# Patient Record
Sex: MALE | Race: WHITE | NOT HISPANIC OR LATINO | URBAN - METROPOLITAN AREA
[De-identification: names, ages, dates, MRNs, and addresses within clinical notes are randomized per-mention and may not be internally consistent; named-entity substitution may affect disease eponyms.]

---

## 2018-08-23 ENCOUNTER — INPATIENT (INPATIENT)
Facility: HOSPITAL | Age: 83
LOS: 1 days | Discharge: HOME | End: 2018-08-25
Attending: SURGERY | Admitting: SURGERY
Payer: COMMERCIAL

## 2018-08-23 VITALS
OXYGEN SATURATION: 98 % | TEMPERATURE: 98 F | SYSTOLIC BLOOD PRESSURE: 181 MMHG | HEART RATE: 60 BPM | DIASTOLIC BLOOD PRESSURE: 98 MMHG | RESPIRATION RATE: 18 BRPM

## 2018-08-23 LAB
ALBUMIN SERPL ELPH-MCNC: 3.8 G/DL — SIGNIFICANT CHANGE UP (ref 3.5–5.2)
ALP SERPL-CCNC: 46 U/L — SIGNIFICANT CHANGE UP (ref 30–115)
ALT FLD-CCNC: 11 U/L — SIGNIFICANT CHANGE UP (ref 0–41)
ANION GAP SERPL CALC-SCNC: 12 MMOL/L — SIGNIFICANT CHANGE UP (ref 7–14)
APTT BLD: 31.3 SEC — SIGNIFICANT CHANGE UP (ref 27–39.2)
AST SERPL-CCNC: 25 U/L — SIGNIFICANT CHANGE UP (ref 0–41)
BASOPHILS # BLD AUTO: 0.03 K/UL — SIGNIFICANT CHANGE UP (ref 0–0.2)
BASOPHILS NFR BLD AUTO: 0.3 % — SIGNIFICANT CHANGE UP (ref 0–1)
BILIRUB SERPL-MCNC: 0.3 MG/DL — SIGNIFICANT CHANGE UP (ref 0.2–1.2)
BUN SERPL-MCNC: 28 MG/DL — HIGH (ref 10–20)
CALCIUM SERPL-MCNC: 9.2 MG/DL — SIGNIFICANT CHANGE UP (ref 8.5–10.1)
CHLORIDE SERPL-SCNC: 105 MMOL/L — SIGNIFICANT CHANGE UP (ref 98–110)
CO2 SERPL-SCNC: 25 MMOL/L — SIGNIFICANT CHANGE UP (ref 17–32)
CREAT SERPL-MCNC: 1.2 MG/DL — SIGNIFICANT CHANGE UP (ref 0.7–1.5)
EOSINOPHIL # BLD AUTO: 0.6 K/UL — SIGNIFICANT CHANGE UP (ref 0–0.7)
EOSINOPHIL NFR BLD AUTO: 6.8 % — SIGNIFICANT CHANGE UP (ref 0–8)
ETHANOL SERPL-MCNC: <10 MG/DL — HIGH
GLUCOSE SERPL-MCNC: 130 MG/DL — HIGH (ref 70–99)
HCT VFR BLD CALC: 27.4 % — LOW (ref 42–52)
HGB BLD-MCNC: 9.1 G/DL — LOW (ref 14–18)
IMM GRANULOCYTES NFR BLD AUTO: 1.5 % — HIGH (ref 0.1–0.3)
INR BLD: 1.12 RATIO — SIGNIFICANT CHANGE UP (ref 0.65–1.3)
LACTATE SERPL-SCNC: 1 MMOL/L — SIGNIFICANT CHANGE UP (ref 0.5–2.2)
LIDOCAIN IGE QN: 39 U/L — SIGNIFICANT CHANGE UP (ref 7–60)
LYMPHOCYTES # BLD AUTO: 2.08 K/UL — SIGNIFICANT CHANGE UP (ref 1.2–3.4)
LYMPHOCYTES # BLD AUTO: 23.7 % — SIGNIFICANT CHANGE UP (ref 20.5–51.1)
MCHC RBC-ENTMCNC: 31 PG — SIGNIFICANT CHANGE UP (ref 27–31)
MCHC RBC-ENTMCNC: 33.2 G/DL — SIGNIFICANT CHANGE UP (ref 32–37)
MCV RBC AUTO: 93.2 FL — SIGNIFICANT CHANGE UP (ref 80–94)
MONOCYTES # BLD AUTO: 0.67 K/UL — HIGH (ref 0.1–0.6)
MONOCYTES NFR BLD AUTO: 7.6 % — SIGNIFICANT CHANGE UP (ref 1.7–9.3)
NEUTROPHILS # BLD AUTO: 5.28 K/UL — SIGNIFICANT CHANGE UP (ref 1.4–6.5)
NEUTROPHILS NFR BLD AUTO: 60.1 % — SIGNIFICANT CHANGE UP (ref 42.2–75.2)
PLATELET # BLD AUTO: 176 K/UL — SIGNIFICANT CHANGE UP (ref 130–400)
POTASSIUM SERPL-MCNC: 4.4 MMOL/L — SIGNIFICANT CHANGE UP (ref 3.5–5)
POTASSIUM SERPL-SCNC: 4.4 MMOL/L — SIGNIFICANT CHANGE UP (ref 3.5–5)
PROT SERPL-MCNC: 6.1 G/DL — SIGNIFICANT CHANGE UP (ref 6–8)
PROTHROM AB SERPL-ACNC: 12.1 SEC — SIGNIFICANT CHANGE UP (ref 9.95–12.87)
RBC # BLD: 2.94 M/UL — LOW (ref 4.7–6.1)
RBC # FLD: 17.8 % — HIGH (ref 11.5–14.5)
SODIUM SERPL-SCNC: 142 MMOL/L — SIGNIFICANT CHANGE UP (ref 135–146)
TYPE + AB SCN PNL BLD: SIGNIFICANT CHANGE UP
WBC # BLD: 8.79 K/UL — SIGNIFICANT CHANGE UP (ref 4.8–10.8)
WBC # FLD AUTO: 8.79 K/UL — SIGNIFICANT CHANGE UP (ref 4.8–10.8)

## 2018-08-23 RX ORDER — TETANUS AND DIPHTHERIA TOXOIDS ADSORBED 2; 2 [LF]/.5ML; [LF]/.5ML
0.5 INJECTION INTRAMUSCULAR ONCE
Qty: 0 | Refills: 0 | Status: COMPLETED | OUTPATIENT
Start: 2018-08-23 | End: 2018-08-23

## 2018-08-23 NOTE — ED PROVIDER NOTE - PHYSICAL EXAMINATION
Non toxic appearing, NAD. Head normocephalic, laceration to occiput, C collar in place, no battles sign or raccoon eyes. Skin  warm and dry, no acute rash, skin tear to R hand. PERRLA/EOM, conjunctiva and sclera clear. MM moist, no nasal discharge.  Dentition intact. Pharynx unremarkable. TM's unremarkable, no bulging, normal light reflex, no hemotympanum.  Neck supple, nt, no midline ttp or stepoffs. Back nttp no midline ttp or stepoffs. Heart RRR s1s2 nl, no rub/murmur. Lungs- BS equal, CTAB. Abdomen soft ntnd no r/g, no bruising. Chest no bruising, sub cutaneous emphysema, or crepitus, nttp. Extremities- moves all normally, sensation wnl, no cyanosis. +5/5 strength and sensation wnl in all extremities. No saddle anesthesia, nl rectal tone.

## 2018-08-23 NOTE — ED PROVIDER NOTE - MEDICAL DECISION MAKING DETAILS
85yr old male on asa presented to ed s/p fall. the pt reports that was aboutt o start walking down steps, tripped and fell. pt remembers waking up on floor. no loc, n/v. on exam with hematoma to back, laceation to posterior occiuput, awake alert, gcs 15, s1s2 ctab soft nt/nd. no pain with compression of ribs. pt with significant hematoma, repeat cbc drawn, admitted to surgical team. they will follow repeat cbc.

## 2018-08-23 NOTE — ED ADULT NURSE NOTE - OBJECTIVE STATEMENT
s/p fall down 15-20 steps, +LOC, pt. denies pain at this time, lac noted to back of head and R hand and L shoulder, pt is a&o x4 at this time, on cardiac monitor, brought in as trauma alert, bleeding is controlled

## 2018-08-23 NOTE — ED PROVIDER NOTE - PROGRESS NOTE DETAILS
Pt endorsed to Dr. Ybarra Spoke w/ viviane Enamorado to admit to josh Kurtz f/u CBC pt s/o to me, f/u imaging

## 2018-08-23 NOTE — ED PROVIDER NOTE - ATTENDING CONTRIBUTION TO CARE
Pt presents as trauma activation for fall 10-20 steps with LOC.  Pt is 85yoM hx stroke on aspirin.  Pt seen with ED team on arrival, with trauma joining.  Pt thinks he tripped or missed a step but cannot recall the sequence of events.    Primary survey  airway intact  b/l breath sounds  b/l radial, fem, DP pulses intact  PERRL, GCS 15, sensation intact, moving ext x 4 spontaneously  exposure notable for lac to posterior occiput w/o sig bleeding    VS HR 60-70s SBP 170s    Secondary survey  scalp lac  no anterior facial trauma noted  no nasal/intraoral bleeding noted  c-collar in place  no chest wall tenderness or crepitus  abd soft, NT, ND  pelvis stable  moving ext x 4  skin tear to R hand/wrist  no t/l/s spinal tenderness    PMH/PSH stroke  Meds aspirin (unclear compliance)  NKDA  lives at home w/ wife, works daily as     18g PIV x 2  CTH, c-spine, C/A/P  xray R hand/wrist  tetanus as needed    dispo per trauma team, but concern for possible syncopal event causing fall    pt endorsed to Dr. Ybarra at [phone]

## 2018-08-23 NOTE — ED PROVIDER NOTE - OBJECTIVE STATEMENT
86 y/o F pmh anemia, ICH, prostate ca p/f fall down 20 steps at home, +LOC, pt states he "was at the top of the stairs and I woke up at the bottom", does not recall anything else. Pt has no complaints currently. Denies HA< neck pain, back pain, CP, SOB, n/v, abd pain, extremity pain/weakness/numbness/tingling.

## 2018-08-23 NOTE — ED PROVIDER NOTE - NS ED ROS FT
Constitutional: See HPI.  Eyes: No visual changes, eye pain or discharge.  ENMT: No hearing changes, pain, discharge or infections. No neck pain or stiffness.  Cardiac: No chest pain, SOB or edema. No chest pain with exertion.  Respiratory: No cough or respiratory distress.   GI: No nausea, vomiting, diarrhea or abdominal pain.  : No dysuria, frequency or burning.  MS: No myalgia, muscle weakness, joint pain or back pain.  Neuro: No headache or weakness. + LOC.  Skin: +skin tear, head lac

## 2018-08-23 NOTE — ED PROVIDER NOTE - CARE PLAN
Principal Discharge DX:	Hematoma  Secondary Diagnosis:	Fall  Secondary Diagnosis:	Syncope and collapse

## 2018-08-24 LAB
ANION GAP SERPL CALC-SCNC: 14 MMOL/L — SIGNIFICANT CHANGE UP (ref 7–14)
APTT BLD: 29 SEC — SIGNIFICANT CHANGE UP (ref 27–39.2)
BASOPHILS # BLD AUTO: 0 K/UL — SIGNIFICANT CHANGE UP (ref 0–0.2)
BASOPHILS # BLD AUTO: 0.02 K/UL — SIGNIFICANT CHANGE UP (ref 0–0.2)
BASOPHILS # BLD AUTO: 0.02 K/UL — SIGNIFICANT CHANGE UP (ref 0–0.2)
BASOPHILS NFR BLD AUTO: 0 % — SIGNIFICANT CHANGE UP (ref 0–1)
BASOPHILS NFR BLD AUTO: 0.2 % — SIGNIFICANT CHANGE UP (ref 0–1)
BASOPHILS NFR BLD AUTO: 0.2 % — SIGNIFICANT CHANGE UP (ref 0–1)
BUN SERPL-MCNC: 27 MG/DL — HIGH (ref 10–20)
CALCIUM SERPL-MCNC: 8.7 MG/DL — SIGNIFICANT CHANGE UP (ref 8.5–10.1)
CHLORIDE SERPL-SCNC: 105 MMOL/L — SIGNIFICANT CHANGE UP (ref 98–110)
CO2 SERPL-SCNC: 24 MMOL/L — SIGNIFICANT CHANGE UP (ref 17–32)
CREAT SERPL-MCNC: 1 MG/DL — SIGNIFICANT CHANGE UP (ref 0.7–1.5)
EOSINOPHIL # BLD AUTO: 0 K/UL — SIGNIFICANT CHANGE UP (ref 0–0.7)
EOSINOPHIL # BLD AUTO: 0.04 K/UL — SIGNIFICANT CHANGE UP (ref 0–0.7)
EOSINOPHIL # BLD AUTO: 0.1 K/UL — SIGNIFICANT CHANGE UP (ref 0–0.7)
EOSINOPHIL NFR BLD AUTO: 0 % — SIGNIFICANT CHANGE UP (ref 0–8)
EOSINOPHIL NFR BLD AUTO: 0.3 % — SIGNIFICANT CHANGE UP (ref 0–8)
EOSINOPHIL NFR BLD AUTO: 1 % — SIGNIFICANT CHANGE UP (ref 0–8)
GLUCOSE BLDC GLUCOMTR-MCNC: 141 MG/DL — HIGH (ref 70–99)
GLUCOSE SERPL-MCNC: 140 MG/DL — HIGH (ref 70–99)
HCT VFR BLD CALC: 20.8 % — LOW (ref 42–52)
HCT VFR BLD CALC: 22.4 % — LOW (ref 42–52)
HCT VFR BLD CALC: 25.1 % — LOW (ref 42–52)
HGB BLD-MCNC: 6.9 G/DL — CRITICAL LOW (ref 14–18)
HGB BLD-MCNC: 7.3 G/DL — CRITICAL LOW (ref 14–18)
HGB BLD-MCNC: 8.2 G/DL — LOW (ref 14–18)
IMM GRANULOCYTES NFR BLD AUTO: 0.3 % — SIGNIFICANT CHANGE UP (ref 0.1–0.3)
IMM GRANULOCYTES NFR BLD AUTO: 0.3 % — SIGNIFICANT CHANGE UP (ref 0.1–0.3)
LYMPHOCYTES # BLD AUTO: 1.05 K/UL — LOW (ref 1.2–3.4)
LYMPHOCYTES # BLD AUTO: 1.53 K/UL — SIGNIFICANT CHANGE UP (ref 1.2–3.4)
LYMPHOCYTES # BLD AUTO: 1.83 K/UL — SIGNIFICANT CHANGE UP (ref 1.2–3.4)
LYMPHOCYTES # BLD AUTO: 15 % — LOW (ref 20.5–51.1)
LYMPHOCYTES # BLD AUTO: 15.8 % — LOW (ref 20.5–51.1)
LYMPHOCYTES # BLD AUTO: 5.2 % — LOW (ref 20.5–51.1)
MAGNESIUM SERPL-MCNC: 1.9 MG/DL — SIGNIFICANT CHANGE UP (ref 1.8–2.4)
MCHC RBC-ENTMCNC: 29.9 PG — SIGNIFICANT CHANGE UP (ref 27–31)
MCHC RBC-ENTMCNC: 30.6 PG — SIGNIFICANT CHANGE UP (ref 27–31)
MCHC RBC-ENTMCNC: 30.9 PG — SIGNIFICANT CHANGE UP (ref 27–31)
MCHC RBC-ENTMCNC: 32.6 G/DL — SIGNIFICANT CHANGE UP (ref 32–37)
MCHC RBC-ENTMCNC: 32.7 G/DL — SIGNIFICANT CHANGE UP (ref 32–37)
MCHC RBC-ENTMCNC: 33.2 G/DL — SIGNIFICANT CHANGE UP (ref 32–37)
MCV RBC AUTO: 91.8 FL — SIGNIFICANT CHANGE UP (ref 80–94)
MCV RBC AUTO: 93.3 FL — SIGNIFICANT CHANGE UP (ref 80–94)
MCV RBC AUTO: 93.7 FL — SIGNIFICANT CHANGE UP (ref 80–94)
MONOCYTES # BLD AUTO: 0.34 K/UL — SIGNIFICANT CHANGE UP (ref 0.1–0.6)
MONOCYTES # BLD AUTO: 0.85 K/UL — HIGH (ref 0.1–0.6)
MONOCYTES # BLD AUTO: 0.88 K/UL — HIGH (ref 0.1–0.6)
MONOCYTES NFR BLD AUTO: 1.7 % — SIGNIFICANT CHANGE UP (ref 1.7–9.3)
MONOCYTES NFR BLD AUTO: 7 % — SIGNIFICANT CHANGE UP (ref 1.7–9.3)
MONOCYTES NFR BLD AUTO: 9.1 % — SIGNIFICANT CHANGE UP (ref 1.7–9.3)
NEUTROPHILS # BLD AUTO: 18.56 K/UL — HIGH (ref 1.4–6.5)
NEUTROPHILS # BLD AUTO: 7.15 K/UL — HIGH (ref 1.4–6.5)
NEUTROPHILS # BLD AUTO: 9.39 K/UL — HIGH (ref 1.4–6.5)
NEUTROPHILS NFR BLD AUTO: 73.6 % — SIGNIFICANT CHANGE UP (ref 42.2–75.2)
NEUTROPHILS NFR BLD AUTO: 77.2 % — HIGH (ref 42.2–75.2)
NEUTROPHILS NFR BLD AUTO: 87.8 % — HIGH (ref 42.2–75.2)
NRBC # BLD: 0 /100 WBCS — SIGNIFICANT CHANGE UP (ref 0–0)
NRBC # BLD: 0 /100 WBCS — SIGNIFICANT CHANGE UP (ref 0–0)
PHOSPHATE SERPL-MCNC: 3.7 MG/DL — SIGNIFICANT CHANGE UP (ref 2.1–4.9)
PLATELET # BLD AUTO: 155 K/UL — SIGNIFICANT CHANGE UP (ref 130–400)
PLATELET # BLD AUTO: 161 K/UL — SIGNIFICANT CHANGE UP (ref 130–400)
PLATELET # BLD AUTO: 188 K/UL — SIGNIFICANT CHANGE UP (ref 130–400)
POTASSIUM SERPL-MCNC: 4.6 MMOL/L — SIGNIFICANT CHANGE UP (ref 3.5–5)
POTASSIUM SERPL-SCNC: 4.6 MMOL/L — SIGNIFICANT CHANGE UP (ref 3.5–5)
RBC # BLD: 2.23 M/UL — LOW (ref 4.7–6.1)
RBC # BLD: 2.44 M/UL — LOW (ref 4.7–6.1)
RBC # BLD: 2.68 M/UL — LOW (ref 4.7–6.1)
RBC # FLD: 18 % — HIGH (ref 11.5–14.5)
RBC # FLD: 18.1 % — HIGH (ref 11.5–14.5)
RBC # FLD: 18.4 % — HIGH (ref 11.5–14.5)
SODIUM SERPL-SCNC: 143 MMOL/L — SIGNIFICANT CHANGE UP (ref 135–146)
TROPONIN T SERPL-MCNC: 0.01 NG/ML — SIGNIFICANT CHANGE UP
WBC # BLD: 12.17 K/UL — HIGH (ref 4.8–10.8)
WBC # BLD: 20.13 K/UL — HIGH (ref 4.8–10.8)
WBC # BLD: 9.71 K/UL — SIGNIFICANT CHANGE UP (ref 4.8–10.8)
WBC # FLD AUTO: 12.17 K/UL — HIGH (ref 4.8–10.8)
WBC # FLD AUTO: 20.13 K/UL — HIGH (ref 4.8–10.8)
WBC # FLD AUTO: 9.71 K/UL — SIGNIFICANT CHANGE UP (ref 4.8–10.8)

## 2018-08-24 RX ORDER — SODIUM CHLORIDE 9 MG/ML
1000 INJECTION INTRAMUSCULAR; INTRAVENOUS; SUBCUTANEOUS
Qty: 0 | Refills: 0 | Status: DISCONTINUED | OUTPATIENT
Start: 2018-08-24 | End: 2018-08-25

## 2018-08-24 RX ORDER — METOCLOPRAMIDE HCL 10 MG
10 TABLET ORAL ONCE
Qty: 0 | Refills: 0 | Status: COMPLETED | OUTPATIENT
Start: 2018-08-24 | End: 2018-08-24

## 2018-08-24 RX ORDER — PANTOPRAZOLE SODIUM 20 MG/1
40 TABLET, DELAYED RELEASE ORAL EVERY 12 HOURS
Qty: 0 | Refills: 0 | Status: DISCONTINUED | OUTPATIENT
Start: 2018-08-24 | End: 2018-08-25

## 2018-08-24 RX ADMIN — SODIUM CHLORIDE 75 MILLILITER(S): 9 INJECTION INTRAMUSCULAR; INTRAVENOUS; SUBCUTANEOUS at 05:20

## 2018-08-24 RX ADMIN — Medication 10 MILLIGRAM(S): at 01:58

## 2018-08-24 RX ADMIN — SODIUM CHLORIDE 75 MILLILITER(S): 9 INJECTION INTRAMUSCULAR; INTRAVENOUS; SUBCUTANEOUS at 18:23

## 2018-08-24 RX ADMIN — PANTOPRAZOLE SODIUM 40 MILLIGRAM(S): 20 TABLET, DELAYED RELEASE ORAL at 06:45

## 2018-08-24 RX ADMIN — PANTOPRAZOLE SODIUM 40 MILLIGRAM(S): 20 TABLET, DELAYED RELEASE ORAL at 18:24

## 2018-08-24 RX ADMIN — TETANUS AND DIPHTHERIA TOXOIDS ADSORBED 0.5 MILLILITER(S): 2; 2 INJECTION INTRAMUSCULAR at 01:32

## 2018-08-24 NOTE — H&P ADULT - ASSESSMENT
ASSESSMENT:  85y Male s/p fall from 15-20 stairs with above mentioned physical exam and labs.    PLAN:   -admit to surgery  -GI prophylaxis  -start home meds  -hold ASA    08-24-18 @ 01:16 ASSESSMENT:  85y Male s/p fall from 15-20 stairs with above mentioned physical exam and labs. CT show lumbar back subcutaneous hematoma    PLAN:   -admit to surgery  -GI& DVT prophylaxis prophylaxis  -start home meds  -hold ASA  -repeat H&H  -transfuse if needed    D/W Dr. Kurtz  08-24-18 @ 01:16

## 2018-08-24 NOTE — H&P ADULT - NSHPREVIEWOFSYSTEMS_GEN_ALL_CORE
REVIEW OF SYSTEMS    [x] A ten-point review of systems was otherwise negative except as noted.  [ ] Due to altered mental status/intubation, subjective information were not able to be obtained from the patient. History was obtained, to the extent possible, from review of the chart and collateral sources of information.

## 2018-08-24 NOTE — H&P ADULT - NSHPPHYSICALEXAM_GEN_ALL_CORE
VITAL SIGNS, INS/OUTS (last 24 hours):    ICU Vital Signs Last 24 Hrs  T(C): 36.7 (23 Aug 2018 22:25), Max: 36.8 (23 Aug 2018 22:00)  T(F): 98 (23 Aug 2018 22:25), Max: 98.3 (23 Aug 2018 22:15)  HR: 96 (24 Aug 2018 00:29) (60 - 96)  BP: 105/60 (24 Aug 2018 00:29) (105/60 - 181/98)  RR: 18 (24 Aug 2018 00:29) (16 - 18)  SpO2: 96% (24 Aug 2018 00:29) (96% - 100%)    PHYSICAL EXAM    General: NAD, AAOx3, calm and cooperative  HEENT: NCAT, occiput laceration, C-collar in place  Cardiac: RRR S1, S2, no Murmurs, rubs or gallops  Respiratory: CTAB, normal respiratory effort with subcutaneous emphysema  Abdomen: Soft, non-distended, non-tender, +bowel sounds  Neuro: grossly intact  Vascular: Pulses 2+ throughout, extremities well perfused  Skin: Warm/dry, normal color, no jaundice VITAL SIGNS, INS/OUTS (last 24 hours):    ICU Vital Signs Last 24 Hrs  T(C): 36.7 (23 Aug 2018 22:25), Max: 36.8 (23 Aug 2018 22:00)  T(F): 98 (23 Aug 2018 22:25), Max: 98.3 (23 Aug 2018 22:15)  HR: 96 (24 Aug 2018 00:29) (60 - 96)  BP: 105/60 (24 Aug 2018 00:29) (105/60 - 181/98)  RR: 18 (24 Aug 2018 00:29) (16 - 18)  SpO2: 96% (24 Aug 2018 00:29) (96% - 100%)    PHYSICAL EXAM    General: NAD, AAOx3, calm and cooperative  HEENT: NCAT, occiput laceration, C-collar in place  Cardiac: RRR S1, S2, no Murmurs, rubs or gallops  Respiratory: CTAB, normal respiratory effort  Back: back s.c hematoma appreciated on exam  Abdomen: Soft, non-distended, non-tender, +bowel sounds  Neuro: grossly intact  Vascular: Pulses 2+ throughout, extremities well perfused  Skin: Warm/dry, normal color, no jaundice

## 2018-08-24 NOTE — H&P ADULT - HISTORY OF PRESENT ILLNESS
86 yo M w/ PMH of anemia, ICH, Prostate CA s/p fall down 15-20 steps at home, +LOC, pt states he "was at the top of the stairs and I woke up at the bottom", does not recall anything else. Pt has no complaints currently. Denies headache, neck pain, back pain, CP, SOB, n/v, abd pain, extremity pain/weakness/numbness/tingling.

## 2018-08-24 NOTE — ED PROCEDURE NOTE - ATTENDING CONTRIBUTION TO CARE
. I was present for and supervised the key critical aspects of the procedures performed during the care of the patient.

## 2018-08-24 NOTE — H&P ADULT - NSHPLABSRESULTS_GEN_ALL_CORE
Labs:  CAPILLARY BLOOD GLUCOSE      POCT Blood Glucose.: 113 mg/dL (23 Aug 2018 22:04)                          9.1    8.79  )-----------( 176      ( 23 Aug 2018 22:06 )             27.4       Auto Neutrophil %: 60.1 % (08-23-18 @ 22:06)  Auto Immature Granulocyte %: 1.5 % (08-23-18 @ 22:06)    08-23    142  |  105  |  28<H>  ----------------------------<  130<H>  4.4   |  25  |  1.2      Calcium, Total Serum: 9.2 mg/dL (08-23-18 @ 22:06)      LFTs:             6.1  | 0.3  | 25       ------------------[46      ( 23 Aug 2018 22:06 )  3.8  | x    | 11          Lipase:39     Amylase:x         Lactate, Blood: 1.0 mmol/L (08-23-18 @ 22:06)      Coags:     12.10  ----< 1.12    ( 23 Aug 2018 22:06 )     31.3       IMAGING RESULT  < from: CT Chest w/ IV Cont (08.23.18 @ 23:51) >    Posterior lumbar back subcutaneous hematoma measuring approximately 14.5   x 3.1 x 8.1 cm. Within the superior aspect of this collection are small   foci of hyperdensity suggestive of active bleeding.  There is a second more superior lumbar back subcutaneous combination of   hematoma and edema measuring approximately 18.4 x 1.6 x 9.8 cm.  No evidence for acute fracture or solid organ injury.    < from: CT Cervical Spine No Cont (08.23.18 @ 23:46) >  No evidence of acute cervical spine fracture or dislocation. Moderate to severe degenerative changes often cervical spine.    < from: CT Head No Cont (08.23.18 @ 23:45) >  No CT evidence of acute intracranial pathology. Mild posterior occipital extracalvarial soft tissue swelling. Mild chronic microvascular ischemic changes. Labs:  CAPILLARY BLOOD GLUCOSE      POCT Blood Glucose.: 113 mg/dL (23 Aug 2018 22:04)                          9.1    8.79  )-----------( 176      ( 23 Aug 2018 22:06 )             27.4       Auto Neutrophil %: 60.1 % (08-23-18 @ 22:06)  Auto Immature Granulocyte %: 1.5 % (08-23-18 @ 22:06)    08-23    142  |  105  |  28<H>  ----------------------------<  130<H>  4.4   |  25  |  1.2      Calcium, Total Serum: 9.2 mg/dL (08-23-18 @ 22:06)      LFTs:             6.1  | 0.3  | 25       ------------------[46      ( 23 Aug 2018 22:06 )  3.8  | x    | 11          Lipase:39     Amylase:x         Lactate, Blood: 1.0 mmol/L (08-23-18 @ 22:06)      Coags:     12.10  ----< 1.12    ( 23 Aug 2018 22:06 )     31.3       IMAGING RESULT      < from: CT Chest w/ IV Cont (08.23.18 @ 23:51) >    Posterior lumbar back subcutaneous hematoma measuring approximately 14.5   x 3.1 x 8.1 cm. Within the superior aspect of this collection are small   foci of hyperdensity suggestive of active bleeding.  There is a second more superior lumbar back subcutaneous combination of   hematoma and edema measuring approximately 18.4 x 1.6 x 9.8 cm.  No evidence for acute fracture or solid organ injury.    < from: CT Cervical Spine No Cont (08.23.18 @ 23:46) >  No evidence of acute cervical spine fracture or dislocation. Moderate to severe degenerative changes often cervical spine.    < from: CT Head No Cont (08.23.18 @ 23:45) >  No CT evidence of acute intracranial pathology. Mild posterior occipital extracalvarial soft tissue swelling. Mild chronic microvascular ischemic changes.

## 2018-08-25 VITALS
DIASTOLIC BLOOD PRESSURE: 92 MMHG | TEMPERATURE: 97 F | RESPIRATION RATE: 18 BRPM | SYSTOLIC BLOOD PRESSURE: 151 MMHG | HEART RATE: 73 BPM

## 2018-08-25 LAB
ANION GAP SERPL CALC-SCNC: 11 MMOL/L — SIGNIFICANT CHANGE UP (ref 7–14)
BASOPHILS # BLD AUTO: 0.02 K/UL — SIGNIFICANT CHANGE UP (ref 0–0.2)
BASOPHILS NFR BLD AUTO: 0.2 % — SIGNIFICANT CHANGE UP (ref 0–1)
BUN SERPL-MCNC: 24 MG/DL — HIGH (ref 10–20)
CALCIUM SERPL-MCNC: 8.5 MG/DL — SIGNIFICANT CHANGE UP (ref 8.5–10.1)
CHLORIDE SERPL-SCNC: 106 MMOL/L — SIGNIFICANT CHANGE UP (ref 98–110)
CO2 SERPL-SCNC: 24 MMOL/L — SIGNIFICANT CHANGE UP (ref 17–32)
CREAT SERPL-MCNC: 1.1 MG/DL — SIGNIFICANT CHANGE UP (ref 0.7–1.5)
EOSINOPHIL # BLD AUTO: 0.24 K/UL — SIGNIFICANT CHANGE UP (ref 0–0.7)
EOSINOPHIL NFR BLD AUTO: 2.7 % — SIGNIFICANT CHANGE UP (ref 0–8)
GLUCOSE SERPL-MCNC: 116 MG/DL — HIGH (ref 70–99)
HCT VFR BLD CALC: 22.3 % — LOW (ref 42–52)
HGB BLD-MCNC: 7.2 G/DL — CRITICAL LOW (ref 14–18)
IMM GRANULOCYTES NFR BLD AUTO: 0.4 % — HIGH (ref 0.1–0.3)
LYMPHOCYTES # BLD AUTO: 1.59 K/UL — SIGNIFICANT CHANGE UP (ref 1.2–3.4)
LYMPHOCYTES # BLD AUTO: 17.6 % — LOW (ref 20.5–51.1)
MAGNESIUM SERPL-MCNC: 1.9 MG/DL — SIGNIFICANT CHANGE UP (ref 1.8–2.4)
MCHC RBC-ENTMCNC: 29.6 PG — SIGNIFICANT CHANGE UP (ref 27–31)
MCHC RBC-ENTMCNC: 32.3 G/DL — SIGNIFICANT CHANGE UP (ref 32–37)
MCV RBC AUTO: 91.8 FL — SIGNIFICANT CHANGE UP (ref 80–94)
MONOCYTES # BLD AUTO: 0.81 K/UL — HIGH (ref 0.1–0.6)
MONOCYTES NFR BLD AUTO: 9 % — SIGNIFICANT CHANGE UP (ref 1.7–9.3)
NEUTROPHILS # BLD AUTO: 6.32 K/UL — SIGNIFICANT CHANGE UP (ref 1.4–6.5)
NEUTROPHILS NFR BLD AUTO: 70.1 % — SIGNIFICANT CHANGE UP (ref 42.2–75.2)
NRBC # BLD: 0 /100 WBCS — SIGNIFICANT CHANGE UP (ref 0–0)
PHOSPHATE SERPL-MCNC: 3.1 MG/DL — SIGNIFICANT CHANGE UP (ref 2.1–4.9)
PLATELET # BLD AUTO: 143 K/UL — SIGNIFICANT CHANGE UP (ref 130–400)
POTASSIUM SERPL-MCNC: 4.2 MMOL/L — SIGNIFICANT CHANGE UP (ref 3.5–5)
POTASSIUM SERPL-SCNC: 4.2 MMOL/L — SIGNIFICANT CHANGE UP (ref 3.5–5)
RBC # BLD: 2.43 M/UL — LOW (ref 4.7–6.1)
RBC # FLD: 18.7 % — HIGH (ref 11.5–14.5)
SODIUM SERPL-SCNC: 141 MMOL/L — SIGNIFICANT CHANGE UP (ref 135–146)
WBC # BLD: 9.02 K/UL — SIGNIFICANT CHANGE UP (ref 4.8–10.8)
WBC # FLD AUTO: 9.02 K/UL — SIGNIFICANT CHANGE UP (ref 4.8–10.8)

## 2018-08-25 PROCEDURE — 99233 SBSQ HOSP IP/OBS HIGH 50: CPT

## 2018-08-25 RX ADMIN — PANTOPRAZOLE SODIUM 40 MILLIGRAM(S): 20 TABLET, DELAYED RELEASE ORAL at 06:18

## 2018-08-25 RX ADMIN — SODIUM CHLORIDE 75 MILLILITER(S): 9 INJECTION INTRAMUSCULAR; INTRAVENOUS; SUBCUTANEOUS at 06:19

## 2018-08-25 NOTE — DISCHARGE NOTE ADULT - CARE PROVIDER_API CALL
Ari Jackson), Surgery; Surgical Critical Care  [address] [place] [address]  [address] [place] [address]  Phone: [phone]  Fax: [phone]

## 2018-08-25 NOTE — DISCHARGE NOTE ADULT - HOSPITAL COURSE
84 yo M w/ PMH of anemia, ICH, Prostate CA s/p fall down 15-20 steps at home, +LOC, pt states he "was at the top of the stairs and I woke up at the bottom", does not recall anything else. Pt has no complaints currently. Denies headache, neck pain, back pain, CP, SOB, n/v, abd pain, extremity pain/weakness/numbness/tingling. Small head laceration was repaired with 2 staples. The patient has a back hematoma, serial cbcs were drawn and hgb of 6.2, the patient was transfused 1 unit prbc, repeat hgbs were stable at 7.2 and 7.3. The patient ambulated and tolerated a regular diet, he will go home with follow up with his primary care doctor and trauma clinic to remove staples.

## 2018-08-25 NOTE — PROGRESS NOTE ADULT - ASSESSMENT
Assessment:  85y Male patient admitted s/p fall, posterior lumbar subcutaneous hematoma, , with the above physical exam, labs, and imaging findings.    Plan:  -f/u CBC  -possible d/c home if CBC stable

## 2018-08-25 NOTE — DISCHARGE NOTE ADULT - PATIENT PORTAL LINK FT
You can access the [facility] Patient Portal, offered by [facility], by registering with the following website: [email or web address]

## 2018-08-25 NOTE — DISCHARGE NOTE ADULT - CARE PLAN
Principal Discharge DX:	Hematoma  Goal:	Complete recovery  Assessment and plan of treatment:	hemoglobin has been stable s/p 1 unit prbc transfusion  Follow up with Primary Care doctor for repeat cbc  Secondary Diagnosis:	Scalp laceration  Assessment and plan of treatment:	s/p laceration repair with staples  Please call to schedule an appointment at the trauma clinic in 10 days to have staples removed.

## 2018-08-25 NOTE — DISCHARGE NOTE ADULT - PLAN OF CARE
Complete recovery hemoglobin has been stable s/p 1 unit prbc transfusion  Follow up with Primary Care doctor for repeat cbc s/p laceration repair with staples  Please call to schedule an appointment at the trauma clinic in 10 days to have staples removed.

## 2018-08-25 NOTE — PROGRESS NOTE ADULT - SUBJECTIVE AND OBJECTIVE BOX
Progress Note: Trauma Surgery  Patient: ADRIAN FENG , 85y (06-Mar-1933)Male   MRN: [id number]  Location: [address]  Visit: 08-24-18 Inpatient  Date: 08-25-18 @ 01:18  Hospital Day: 2  Post-op Day:     Procedure/Injury: s/p fall, posterior lumbar subcutaneous hematoma  Events over 24h: Hb dropped from 8.3 to 6.9 yesterday, transfused 1 U PRBCs, repeat CBC showed Hb 7.3.  Otherwise no acute events.  Bowel function: Bowel movement [  ] Flatus [x  ]    Vitals: T(F): 99.3 (08-24-18 @ 22:43), Max: 99.3 (08-24-18 @ 22:43)  HR: 83 (08-24-18 @ 22:43)  BP: 133/74 (08-24-18 @ 22:43) (101/62 - 134/60)  RR: 18 (08-24-18 @ 22:43)  SpO2: 98% (08-24-18 @ 16:42)    In:   Out:   Net:   Diet: Diet, Regular (08-24-18 @ 23:45)      Physical Examination:  General: NAD, AAOx3, GCS 15/15  HEENT: NCAT, ENZO, WNL  Heart: S1 S2, No MRG RRR   Lungs: CTABL +BS Equal BL, No WRC  Abdomen:  +BS. SNDNT.  Skin: Warm/dry, hematoma on left lower back, hip unchanged from admission, soft      Medications: [Standing]  pantoprazole  Injectable 40 milliGRAM(s) IV Push every 12 hours  sodium chloride 0.9%. 1000 milliLiter(s) (75 mL/Hr) IV Continuous <Continuous>    Medications:[PRN]    Labs:                        7.3    9.71  )-----------( 155      ( 24 Aug 2018 20:50 )             22.4     08-24    143  |  105  |  27<H>  ----------------------------<  140<H>  4.6   |  24  |  1.0    Ca    8.7      24 Aug 2018 11:47  Phos  3.7     08-24  Mg     1.9     08-24    TPro  6.1  /  Alb  3.8  /  TBili  0.3  /  DBili  x   /  AST  25  /  ALT  11  /  AlkPhos  46  08-23    LIVER FUNCTIONS - ( 23 Aug 2018 22:06 )  Alb: 3.8 g/dL / Pro: 6.1 g/dL / ALK PHOS: 46 U/L / ALT: 11 U/L / AST: 25 U/L / GGT: x           PT/INR - ( 23 Aug 2018 22:06 )   PT: 12.10 sec;   INR: 1.12 ratio         PTT - ( 24 Aug 2018 11:47 )  PTT:29.0 sec    CARDIAC MARKERS ( 24 Aug 2018 03:15 )  x     / 0.01 ng/mL / x     / x     / x          Imaging:  None/24h        Dispo:  Seen and evaluated by PT: Yes [  ] No [  ]  Physiatry reccomendations: Home with VNS [  ] , SNF/STR [  ] , Inpatient rehab [  ] , No needs + D/C home [  ]  SW:     Date/Time: 08-25-18 @ 01:18

## 2018-08-29 PROBLEM — Z00.00 ENCOUNTER FOR PREVENTIVE HEALTH EXAMINATION: Status: ACTIVE | Noted: 2018-08-29
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
## 2018-08-31 DIAGNOSIS — S30.0XXA CONTUSION OF LOWER BACK AND PELVIS, INITIAL ENCOUNTER: ICD-10-CM

## 2018-08-31 DIAGNOSIS — Z23 ENCOUNTER FOR IMMUNIZATION: ICD-10-CM
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
## 2018-08-31 DIAGNOSIS — T14.8XXA OTHER INJURY OF UNSPECIFIED BODY REGION, INITIAL ENCOUNTER: ICD-10-CM

## 2018-08-31 DIAGNOSIS — S01.01XA LACERATION WITHOUT FOREIGN BODY OF SCALP, INITIAL ENCOUNTER: ICD-10-CM
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
## 2018-08-31 DIAGNOSIS — D64.9 ANEMIA, UNSPECIFIED: ICD-10-CM

## 2018-08-31 DIAGNOSIS — R55 SYNCOPE AND COLLAPSE: ICD-10-CM

## 2018-08-31 DIAGNOSIS — Z85.46 PERSONAL HISTORY OF MALIGNANT NEOPLASM OF PROSTATE: ICD-10-CM

## 2020-01-17 ENCOUNTER — INPATIENT (INPATIENT)
Facility: HOSPITAL | Age: 85
LOS: 4 days | End: 2020-01-22
Attending: INTERNAL MEDICINE | Admitting: INTERNAL MEDICINE
Payer: COMMERCIAL

## 2020-01-17 VITALS
HEART RATE: 61 BPM | RESPIRATION RATE: 20 BRPM | SYSTOLIC BLOOD PRESSURE: 184 MMHG | DIASTOLIC BLOOD PRESSURE: 76 MMHG | OXYGEN SATURATION: 100 %

## 2020-01-17 LAB
ALBUMIN SERPL ELPH-MCNC: 4 G/DL — SIGNIFICANT CHANGE UP (ref 3.5–5.2)
ALP SERPL-CCNC: 45 U/L — SIGNIFICANT CHANGE UP (ref 30–115)
ALT FLD-CCNC: 5 U/L — SIGNIFICANT CHANGE UP (ref 0–41)
ANION GAP SERPL CALC-SCNC: 14 MMOL/L — SIGNIFICANT CHANGE UP (ref 7–14)
APTT BLD: 26.8 SEC — LOW (ref 27–39.2)
AST SERPL-CCNC: 17 U/L — SIGNIFICANT CHANGE UP (ref 0–41)
BASOPHILS # BLD AUTO: 0.03 K/UL — SIGNIFICANT CHANGE UP (ref 0–0.2)
BASOPHILS NFR BLD AUTO: 0.3 % — SIGNIFICANT CHANGE UP (ref 0–1)
BILIRUB SERPL-MCNC: 0.4 MG/DL — SIGNIFICANT CHANGE UP (ref 0.2–1.2)
BLD GP AB SCN SERPL QL: SIGNIFICANT CHANGE UP
BUN SERPL-MCNC: 31 MG/DL — HIGH (ref 10–20)
CALCIUM SERPL-MCNC: 9.4 MG/DL — SIGNIFICANT CHANGE UP (ref 8.5–10.1)
CHLORIDE SERPL-SCNC: 103 MMOL/L — SIGNIFICANT CHANGE UP (ref 98–110)
CK MB CFR SERPL CALC: 3.3 NG/ML — SIGNIFICANT CHANGE UP (ref 0.6–6.3)
CK SERPL-CCNC: 63 U/L — SIGNIFICANT CHANGE UP (ref 0–225)
CO2 SERPL-SCNC: 24 MMOL/L — SIGNIFICANT CHANGE UP (ref 17–32)
CREAT SERPL-MCNC: 1.3 MG/DL — SIGNIFICANT CHANGE UP (ref 0.7–1.5)
EOSINOPHIL # BLD AUTO: 0.27 K/UL — SIGNIFICANT CHANGE UP (ref 0–0.7)
EOSINOPHIL NFR BLD AUTO: 2.8 % — SIGNIFICANT CHANGE UP (ref 0–8)
GLUCOSE SERPL-MCNC: 157 MG/DL — HIGH (ref 70–99)
HCT VFR BLD CALC: 26.4 % — LOW (ref 42–52)
HGB BLD-MCNC: 8.6 G/DL — LOW (ref 14–18)
IMM GRANULOCYTES NFR BLD AUTO: 0.3 % — SIGNIFICANT CHANGE UP (ref 0.1–0.3)
INR BLD: 1.12 RATIO — SIGNIFICANT CHANGE UP (ref 0.65–1.3)
LYMPHOCYTES # BLD AUTO: 2.56 K/UL — SIGNIFICANT CHANGE UP (ref 1.2–3.4)
LYMPHOCYTES # BLD AUTO: 26.6 % — SIGNIFICANT CHANGE UP (ref 20.5–51.1)
MCHC RBC-ENTMCNC: 31.4 PG — HIGH (ref 27–31)
MCHC RBC-ENTMCNC: 32.6 G/DL — SIGNIFICANT CHANGE UP (ref 32–37)
MCV RBC AUTO: 96.4 FL — HIGH (ref 80–94)
MONOCYTES # BLD AUTO: 0.65 K/UL — HIGH (ref 0.1–0.6)
MONOCYTES NFR BLD AUTO: 6.8 % — SIGNIFICANT CHANGE UP (ref 1.7–9.3)
NEUTROPHILS # BLD AUTO: 6.08 K/UL — SIGNIFICANT CHANGE UP (ref 1.4–6.5)
NEUTROPHILS NFR BLD AUTO: 63.2 % — SIGNIFICANT CHANGE UP (ref 42.2–75.2)
NRBC # BLD: 0 /100 WBCS — SIGNIFICANT CHANGE UP (ref 0–0)
PLATELET # BLD AUTO: 278 K/UL — SIGNIFICANT CHANGE UP (ref 130–400)
POTASSIUM SERPL-MCNC: 4 MMOL/L — SIGNIFICANT CHANGE UP (ref 3.5–5)
POTASSIUM SERPL-SCNC: 4 MMOL/L — SIGNIFICANT CHANGE UP (ref 3.5–5)
PROT SERPL-MCNC: 7.2 G/DL — SIGNIFICANT CHANGE UP (ref 6–8)
PROTHROM AB SERPL-ACNC: 12.9 SEC — HIGH (ref 9.95–12.87)
RBC # BLD: 2.74 M/UL — LOW (ref 4.7–6.1)
RBC # FLD: 18.7 % — HIGH (ref 11.5–14.5)
SODIUM SERPL-SCNC: 141 MMOL/L — SIGNIFICANT CHANGE UP (ref 135–146)
TROPONIN T SERPL-MCNC: 0.01 NG/ML — SIGNIFICANT CHANGE UP
WBC # BLD: 9.62 K/UL — SIGNIFICANT CHANGE UP (ref 4.8–10.8)
WBC # FLD AUTO: 9.62 K/UL — SIGNIFICANT CHANGE UP (ref 4.8–10.8)

## 2020-01-17 PROCEDURE — 71045 X-RAY EXAM CHEST 1 VIEW: CPT | Mod: 26

## 2020-01-17 PROCEDURE — 71045 X-RAY EXAM CHEST 1 VIEW: CPT | Mod: 26,77

## 2020-01-17 PROCEDURE — 76937 US GUIDE VASCULAR ACCESS: CPT | Mod: 26

## 2020-01-17 PROCEDURE — 99291 CRITICAL CARE FIRST HOUR: CPT | Mod: 25

## 2020-01-17 PROCEDURE — 70496 CT ANGIOGRAPHY HEAD: CPT | Mod: 26

## 2020-01-17 PROCEDURE — 99221 1ST HOSP IP/OBS SF/LOW 40: CPT

## 2020-01-17 PROCEDURE — 36620 INSERTION CATHETER ARTERY: CPT

## 2020-01-17 PROCEDURE — 93010 ELECTROCARDIOGRAM REPORT: CPT

## 2020-01-17 PROCEDURE — 36556 INSERT NON-TUNNEL CV CATH: CPT

## 2020-01-17 PROCEDURE — 31500 INSERT EMERGENCY AIRWAY: CPT

## 2020-01-17 PROCEDURE — 70450 CT HEAD/BRAIN W/O DYE: CPT | Mod: 26,59

## 2020-01-17 RX ORDER — ALTEPLASE 100 MG
6.9 KIT INTRAVENOUS ONCE
Refills: 0 | Status: DISCONTINUED | OUTPATIENT
Start: 2020-01-17 | End: 2020-01-17

## 2020-01-17 RX ORDER — ALTEPLASE 100 MG
62.4 KIT INTRAVENOUS ONCE
Refills: 0 | Status: DISCONTINUED | OUTPATIENT
Start: 2020-01-17 | End: 2020-01-17

## 2020-01-17 RX ORDER — LEVETIRACETAM 250 MG/1
1000 TABLET, FILM COATED ORAL EVERY 12 HOURS
Refills: 0 | Status: DISCONTINUED | OUTPATIENT
Start: 2020-01-17 | End: 2020-01-20

## 2020-01-17 RX ORDER — LEVETIRACETAM 250 MG/1
1000 TABLET, FILM COATED ORAL ONCE
Refills: 0 | Status: COMPLETED | OUTPATIENT
Start: 2020-01-17 | End: 2020-01-17

## 2020-01-17 RX ORDER — FENTANYL CITRATE 50 UG/ML
100 INJECTION INTRAVENOUS ONCE
Refills: 0 | Status: DISCONTINUED | OUTPATIENT
Start: 2020-01-17 | End: 2020-01-17

## 2020-01-17 RX ORDER — SODIUM CHLORIDE 5 G/100ML
300 INJECTION, SOLUTION INTRAVENOUS
Refills: 0 | Status: DISCONTINUED | OUTPATIENT
Start: 2020-01-17 | End: 2020-01-17

## 2020-01-17 RX ORDER — ROCURONIUM BROMIDE 10 MG/ML
100 VIAL (ML) INTRAVENOUS ONCE
Refills: 0 | Status: COMPLETED | OUTPATIENT
Start: 2020-01-17 | End: 2020-01-17

## 2020-01-17 RX ORDER — PROPOFOL 10 MG/ML
20 INJECTION, EMULSION INTRAVENOUS
Qty: 1000 | Refills: 0 | Status: DISCONTINUED | OUTPATIENT
Start: 2020-01-17 | End: 2020-01-20

## 2020-01-17 RX ORDER — SODIUM CHLORIDE 5 G/100ML
500 INJECTION, SOLUTION INTRAVENOUS
Refills: 0 | Status: DISCONTINUED | OUTPATIENT
Start: 2020-01-17 | End: 2020-01-19

## 2020-01-17 RX ORDER — NICARDIPINE HYDROCHLORIDE 30 MG/1
5 CAPSULE, EXTENDED RELEASE ORAL
Qty: 40 | Refills: 0 | Status: DISCONTINUED | OUTPATIENT
Start: 2020-01-17 | End: 2020-01-20

## 2020-01-17 RX ORDER — FENTANYL CITRATE 50 UG/ML
100 INJECTION INTRAVENOUS ONCE
Refills: 0 | Status: DISCONTINUED | OUTPATIENT
Start: 2020-01-17 | End: 2020-01-18

## 2020-01-17 RX ORDER — LIDOCAINE HCL 20 MG/ML
100 VIAL (ML) INJECTION ONCE
Refills: 0 | Status: COMPLETED | OUTPATIENT
Start: 2020-01-17 | End: 2020-01-17

## 2020-01-17 RX ORDER — ETOMIDATE 2 MG/ML
20 INJECTION INTRAVENOUS ONCE
Refills: 0 | Status: COMPLETED | OUTPATIENT
Start: 2020-01-17 | End: 2020-01-17

## 2020-01-17 RX ORDER — PANTOPRAZOLE SODIUM 20 MG/1
40 TABLET, DELAYED RELEASE ORAL DAILY
Refills: 0 | Status: DISCONTINUED | OUTPATIENT
Start: 2020-01-17 | End: 2020-01-20

## 2020-01-17 RX ADMIN — ETOMIDATE 20 MILLIGRAM(S): 2 INJECTION INTRAVENOUS at 19:18

## 2020-01-17 RX ADMIN — PROPOFOL 9.24 MICROGRAM(S)/KG/MIN: 10 INJECTION, EMULSION INTRAVENOUS at 19:46

## 2020-01-17 RX ADMIN — SODIUM CHLORIDE 15 MILLILITER(S): 5 INJECTION, SOLUTION INTRAVENOUS at 23:01

## 2020-01-17 RX ADMIN — LEVETIRACETAM 400 MILLIGRAM(S): 250 TABLET, FILM COATED ORAL at 20:18

## 2020-01-17 RX ADMIN — Medication 100 MILLIGRAM(S): at 19:17

## 2020-01-17 RX ADMIN — FENTANYL CITRATE 100 MICROGRAM(S): 50 INJECTION INTRAVENOUS at 21:20

## 2020-01-17 RX ADMIN — Medication 100 MILLIGRAM(S): at 19:18

## 2020-01-17 RX ADMIN — NICARDIPINE HYDROCHLORIDE 25 MG/HR: 30 CAPSULE, EXTENDED RELEASE ORAL at 19:46

## 2020-01-17 RX ADMIN — FENTANYL CITRATE 100 MICROGRAM(S): 50 INJECTION INTRAVENOUS at 21:00

## 2020-01-17 NOTE — ED PROVIDER NOTE - ATTENDING CONTRIBUTION TO CARE
I personally evaluated patient. I agree with the findings and plan with all documentation on chart except as documented  in my note.    85 y/o M w/ PMH CVA w/ no residual deficits, anemia, prostate ca who presents with AMS, dysarthria, and right sided weakness.  Last known well was 6 pm as patient was with wife going to [employer]. Patient became altered in the car and couldn't get out and ambulance was called.  Stroke Code called for patient.  Patient initial NIH Stroke Score was 28.  Patient evaluated by Neurology and rushed to CT scan.  t-PA ordered as patient may be a t-PA candidate and Code NI also called.  CT scan of brain instead showed a large hemorrhage with midline shift and intraventricular extension.     Patient had decompensating mental status and required emergency intubation. Neurosurgery I personally evaluated patient. I agree with the findings and plan with all documentation on chart except as documented  in my note.    87 y/o M w/ PMH CVA w/ no residual deficits, anemia, prostate ca who presents with AMS, dysarthria, and right sided weakness.  Last known well was 6 pm as patient was with wife going to [employer]. Patient became altered in the car and couldn't get out and ambulance was called.  Stroke Code called for patient.  Patient initial NIH Stroke Score was 28.  Patient evaluated by Neurology and rushed to CT scan.  t-PA ordered as patient may be a t-PA candidate and Code NI also called.  CT scan of brain instead showed a large hemorrhage with midline shift and intraventricular extension.     Patient had decompensating mental status and required emergency intubation. Neurosurgery STAT consulted and evaluated patient at bedside.  Patient given Keppra and started on Nicardipine drip and Propofol.  Initial concern for possible impending herniation as blood pressure transiently went to  with HR in the 40's, but this immediately improved with Nicardipine/Propofol.  Arterial line placed for BP monitoring.  Initial radial arterial line was positional and a femoral line was placed. Patient given analgesia and sedation.  Neurology following case and already evaluated patient upon arrival. Keppra given.  Neurosurgery recommends repeat CT head in 6 hours.  Patient may require a drain but does not require immediate Neurosurgical intervention at this time.  Family spoken to in detail about results and plan of care. ED work up reviewed. Patient having repeat Ct scan and going to the MICU.

## 2020-01-17 NOTE — ED PROVIDER NOTE - CARE PLAN
Principal Discharge DX:	Intracranial hemorrhage Principal Discharge DX:	Intracranial hemorrhage  Secondary Diagnosis:	Acute respiratory failure, unspecified whether with hypoxia or hypercapnia  Secondary Diagnosis:	Intraventricular hemorrhage, nontraumatic  Secondary Diagnosis:	Midline shift of brain

## 2020-01-17 NOTE — ED PROVIDER NOTE - CLINICAL SUMMARY MEDICAL DECISION MAKING FREE TEXT BOX
85 y/o M w/ PMH CVA w/ no residual deficits, anemia, prostate ca who presents with AMS, dysarthria, and right sided weakness.  Last known well was 6 pm as patient was with wife going to [employer]. Patient became altered in the car and couldn't get out and ambulance was called.  Stroke Code called for patient.  Patient initial NIH Stroke Score was 28.  Patient evaluated by Neurology and rushed to CT scan.  t-PA ordered as patient may be a t-PA candidate and Code NI also called.  CT scan of brain instead showed a large hemorrhage with midline shift and intraventricular extension.     Patient had decompensating mental status and required emergency intubation. Neurosurgery STAT consulted and evaluated patient at bedside.  Patient given Keppra and started on Nicardipine drip and Propofol.  Initial concern for possible impending herniation as blood pressure transiently went to  with HR in the 40's, but this immediately improved with Nicardipine/Propofol.  Arterial line placed for BP monitoring.  Initial radial arterial line was positional and a femoral line was placed. Patient given analgesia and sedation.  Neurology following case and already evaluated patient upon arrival. Keppra given.  Neurosurgery recommends repeat CT head in 6 hours.  Patient may require a drain but does not require immediate Neurosurgical intervention at this time.  Family spoken to in detail about results and plan of care. ED work up reviewed. Patient having repeat Ct scan and going to the MICU.

## 2020-01-17 NOTE — CONSULT NOTE ADULT - ASSESSMENT
Impression:  85 yo m pmh ICH in the past was driving and at 1800 developed acute onset aphasia. Pre note stroke code. On arrival NIHSS 30 for RHP and aphasia. Code NI alert called. CTH revealed large left sided IVH. Not a candidate for IV tPA due to ICH on CTH. Discussed with Dr Santiago.    Suggestion:  Call neurosurgery stat.  Blood pressure control.   Seizure precautions.     Dagoberto Yarbrough Np  [phone]

## 2020-01-17 NOTE — ED PROVIDER NOTE - OBJECTIVE STATEMENT
86y M w/ PMH of CVA w/ no residual deficits, anemia, prostate ca, and previous ICH presents with R. sided deficits that started at 6:02pm. States was at [employer] parking [place]. Was last seen normal at 6pm. Started having weakness of RUE + RLE and complete dysarthria. Has been getting significantly worse. No trauma.

## 2020-01-17 NOTE — ED ADULT NURSE REASSESSMENT NOTE - NS ED NURSE REASSESS COMMENT FT1
MD aware, Rn unsuccessful for garcia placement. A- line also not intact, no good waveform present. MD aware.

## 2020-01-17 NOTE — H&P ADULT - HISTORY OF PRESENT ILLNESS
86y M w/ PMH of CVA w/ no residual deficits, anemia, prostate ca, and previous ICH presents with R. sided deficits that started at 6:02pm. States was at [employer] parking lot. Was last seen normal at 6pm. Started having weakness of RUE + RLE and complete dysarthria. Has been getting significantly worse. No trauma. 87 yo M with hx of CVA (ICH) 3 years ago with no residual deficits, Prostate cancer, Anemia presents to ED for right sided weakness started 6 pm. As per son at bedside, patient was driving to [employer] and felt "not right". So he pulled over to the side and he couldn't get out of the car because of weakness. So wife called EMS who brought him. Patient is not taking ASA or AC at home.   No hx of trauma.     In ED, patient was found to have 4.2 cm frontotemporal intraparenchymal hemorrhage with intraventricular extension. Adjacent mass effect with left to right midline shift of 4 mm. Effacement of the suprasellar cistern.  Patient is also found to have SBP ~ 200. Patient was intubated for airway protection and started on nicardipine drip.

## 2020-01-17 NOTE — H&P ADULT - NSHPREVIEWOFSYSTEMS_GEN_ALL_CORE

## 2020-01-17 NOTE — ED PROCEDURE NOTE - CPROC ED TRACHE INTUB DETAIL1
Patient was pre-oxygenated. An endotracheal tube (ETT) was placed through the vocal cords into the trachea.  ETT position was confirmed by auscultation of bilateral breath sounds to all lung fields. ETCO2 level was appropriate./Patient connected to ventilator with settings as ordered./During intubation, applied gentle pressure to the cricoid cartilage./Difficult/crash intubation (see additional details section).

## 2020-01-17 NOTE — CONSULT NOTE ADULT - ASSESSMENT
87 y/o male with intraparenchymal hemorrhage with intraventricular extension.   - CTA head  - Repeat CT non con in 2 hrs   - Control BP  - Keppra  - Sodium target >140  - Neuro c/s  - Possible EVD placement if scans show hydrocephalus  - Discussed with Dr. Cortes

## 2020-01-17 NOTE — ED PROCEDURE NOTE - CPROC ED ARTER LINE DETAIL1
Using sterile technique, the correct location was identified, and a needle was inserted into the artery (specify in FT)./Line was sutured in place./Connected to a pressurized flush line./Hemostasis was achieved with direct pressure, and a dry sterile dressing applied./Positive blood return was obtained via the catheter.

## 2020-01-17 NOTE — H&P ADULT - NSHPLABSRESULTS_GEN_ALL_CORE
8.6    9.62  )-----------( 278      ( 17 Jan 2020 19:04 )             26.4             01-17    141  |  103  |  31<H>  ----------------------------<  157<H>  4.0   |  24  |  1.3    Ca    9.4      17 Jan 2020 19:04    TPro  7.2  /  Alb  4.0  /  TBili  0.4  /  DBili  x   /  AST  17  /  ALT  5   /  AlkPhos  45  01-17    LIVER FUNCTIONS - ( 17 Jan 2020 19:04 )  Alb: 4.0 g/dL / Pro: 7.2 g/dL / ALK PHOS: 45 U/L / ALT: 5 U/L / AST: 17 U/L / GGT: x                 PT/INR - ( 17 Jan 2020 19:04 )   PT: 12.90 sec;   INR: 1.12 ratio         PTT - ( 17 Jan 2020 19:04 )  PTT:26.8 sec  CARDIAC MARKERS ( 17 Jan 2020 19:04 )  x     / 0.01 ng/mL / 63 U/L / x     / 3.3 ng/mL      ABG - ( 17 Jan 2020 22:18 )  pH, Arterial: 7.38  pH, Blood: x     /  pCO2: 44    /  pO2: 430   / HCO3: 26    / Base Excess: 0.4   /  SaO2: 100

## 2020-01-17 NOTE — ED PROVIDER NOTE - SECONDARY DIAGNOSIS.
Acute respiratory failure, unspecified whether with hypoxia or hypercapnia Intraventricular hemorrhage, nontraumatic Midline shift of brain

## 2020-01-17 NOTE — CONSULT NOTE ADULT - SUBJECTIVE AND OBJECTIVE BOX
Neurology Consult    Patient is a 86y old  Male who presents with a chief complaint of RHP    HPI:  85 yo m pmh ICH in the past was driving and at 1800 developed acute onset aphasia. Pre note stroke code. On arrival NIHSS 30 for RHP and aphasia. Code NI alert called. CTH revealed large left sided IVH. Not a candidate for IV tPA due to ICH on CTH. Code NI stop initiated.     PAST MEDICAL & SURGICAL HISTORY:  Prostate cancer  Anemia  No significant past surgical history      FAMILY HISTORY:  No pertinent family history in first degree relatives      Social History: (-) x 3    Allergies    No Known Allergies    Intolerances        MEDICATIONS  (STANDING):  etomidate Injectable 20 milliGRAM(s) IV Push Once  levETIRAcetam  IVPB 1000 milliGRAM(s) IV Intermittent once  lidocaine 2% IntraVenous Injection - Peds 100 milliGRAM(s) IV Push Once  niCARdipine Infusion 5 mG/Hr (25 mL/Hr) IV Continuous <Continuous>  propofol Infusion 20 MICROgram(s)/kG/Min (9.24 mL/Hr) IV Continuous <Continuous>  rocuronium Injectable 100 milliGRAM(s) IV Push Once    Vital Signs Last 24 Hrs  T(C): --  T(F): --  HR: --  BP: --  BP(mean): --  RR: --  SpO2: --    Labs:   CBC Full  -  ( 17 Jan 2020 19:04 )  WBC Count : 9.62 K/uL  RBC Count : 2.74 M/uL  Hemoglobin : 8.6 g/dL  Hematocrit : 26.4 %  Platelet Count - Automated : 278 K/uL  Mean Cell Volume : 96.4 fL  Mean Cell Hemoglobin : 31.4 pg  Mean Cell Hemoglobin Concentration : 32.6 g/dL  Auto Neutrophil # : 6.08 K/uL  Auto Lymphocyte # : 2.56 K/uL  Auto Monocyte # : 0.65 K/uL  Auto Eosinophil # : 0.27 K/uL  Auto Basophil # : 0.03 K/uL  Auto Neutrophil % : 63.2 %  Auto Lymphocyte % : 26.6 %  Auto Monocyte % : 6.8 %  Auto Eosinophil % : 2.8 %  Auto Basophil % : 0.3 %            PT/INR - ( 17 Jan 2020 19:04 )   PT: 12.90 sec;   INR: 1.12 ratio Neurology Consult    Patient is a 86y old  Male who presents with a chief complaint of RHP    HPI:  85 yo m pmh ICH in the past was driving and at 1800 developed acute onset aphasia. Pre note stroke code. On arrival NIHSS 30 for RHP and aphasia. Code NI alert called. CTH revealed large left sided IVH. Not a candidate for IV tPA due to ICH on CTH. Code NI stop initiated.     PAST MEDICAL & SURGICAL HISTORY:  Prostate cancer  Anemia  No significant past surgical history      FAMILY HISTORY:  No pertinent family history in first degree relatives      Social History: (-) x 3    Allergies    No Known Allergies    Intolerances        MEDICATIONS  (STANDING):  etomidate Injectable 20 milliGRAM(s) IV Push Once  levETIRAcetam  IVPB 1000 milliGRAM(s) IV Intermittent once  lidocaine 2% IntraVenous Injection - Peds 100 milliGRAM(s) IV Push Once  niCARdipine Infusion 5 mG/Hr (25 mL/Hr) IV Continuous <Continuous>  propofol Infusion 20 MICROgram(s)/kG/Min (9.24 mL/Hr) IV Continuous <Continuous>  rocuronium Injectable 100 milliGRAM(s) IV Push Once    Vital Signs Last 24 Hrs  T(C): --  T(F): --  HR: --  BP: --  BP(mean): --  RR: --  SpO2: --    Labs:   CBC Full  -  ( 17 Jan 2020 19:04 )  WBC Count : 9.62 K/uL  RBC Count : 2.74 M/uL  Hemoglobin : 8.6 g/dL  Hematocrit : 26.4 %  Platelet Count - Automated : 278 K/uL  Mean Cell Volume : 96.4 fL  Mean Cell Hemoglobin : 31.4 pg  Mean Cell Hemoglobin Concentration : 32.6 g/dL  Auto Neutrophil # : 6.08 K/uL  Auto Lymphocyte # : 2.56 K/uL  Auto Monocyte # : 0.65 K/uL  Auto Eosinophil # : 0.27 K/uL  Auto Basophil # : 0.03 K/uL  Auto Neutrophil % : 63.2 %  Auto Lymphocyte % : 26.6 %  Auto Monocyte % : 6.8 %  Auto Eosinophil % : 2.8 %  Auto Basophil % : 0.3 %      NIH Stroke Scale:   · NIH Stroke Scale: LOC	(3) Responds only with reflex motor or autonomic effects or totally unresponsive, flaccid, and areflexic	  · NIH Stroke Scale: LOC Question	(2) Answers neither question correctly	  · NIH Stroke Scale: LOC Command	(2) Performs neither task correctly	  · NIH Stroke Scale: Gaze	(2) Forced deviation, or total gaze paresis not overcome by the oculocephalic maneuver	  · NIH Stroke Scale: Visual	(2) Complete hemianopia	  · NIH Stroke Scale: Facial	(2) Partial paralysis (total or near-total paralysis of lower face)	  · NIH Stroke Scale: Arm Left	(0) No drift; limb holds 90 (or 45) degrees for full 10 secs	  · NIH Stroke Scale: Arm Right	(4) No movement	  · NIH Stroke Scale: Leg Left	(0) No drift; leg holds 30 degree position for full 5 secs	  · NIH Stroke Scale: Leg Right	(4) No movement	  · NIH Stroke Scale: Ataxia	(0) Absent	  · NIH Stroke Scale: Sensory	(2) Severe to total sensory loss; patient is not aware of being touched in the face, arm, and leg	  · NIH Stroke Scale: Language	(3) Mute, global aphasia; no usable speech or auditory comprehension	  · NIH Stroke Scale: Dysarthria	(2) Severe dysarthria; patients speech is so slurred as to be unintelligible in the absence of or out of proportion to any dysphasia, or is mute/anarthric	  · NIH Stroke Scale: Extinct Inattention	(2) Profound caridad-inattention/extinction more than 1 modality	  · NIH Stroke Scale: Total	30	      ICH score 5 m-Rs 0 at baseline now 5  PT/INR - ( 17 Jan 2020 19:04 )   PT: 12.90 sec;   INR: 1.12 ratio

## 2020-01-17 NOTE — H&P ADULT - ASSESSMENT
86y M w/ PMH of CVA w/ no residual deficits, anemia, prostate ca, and previous ICH presents with R. sided deficits that started at 6:02pm. States was at [employer] parking [place]. Was last seen normal at 6pm. Started having weakness of RUE + RLE and complete dysarthria. Has been getting significantly worse. No trauma.       AMS with RUE and RLE weakness likely 2/2 ICH likely 2/2 HTN Emergency   - CTH: 4.2 cm frontotemporal intraparenchymal hemorrhage with intraventricular extension. Adjacent mass effect with left to right midline shift of 4 mm. Effacement of the suprasellar cistern.  - start on Keppra 1000mg BID  - Start on 3% NS @ 15cc/hr (target serum Na > 140)  - repeat CTH in 2 hours   - BP control with nicardipine drip (target SBP < 140)   - HOB elevated 30 degree   - f/u NeuroSurgery   - f/u Neurology     Anemia   - monitor Hb     Prostate cancer  - outpatient f/u.     DVT ppx: SCD  GI ppx: PPI   Activity: Bedrest  Diet: NPO 87 yo M with hx of CVA (ICH) 3 years ago with no residual deficits, Prostate cancer, Anemia presents to ED for right sided weakness started 6 pm. As per son at bedside, patient was driving to [employer] and felt "not right". So he pulled over to the side and he couldn't get out of the car because of weakness. So wife called EMS who brought him. Patient is not taking ASA or AC at home.   No hx of trauma.   In ED, patient was found to have 4.2 cm frontotemporal intraparenchymal hemorrhage with intraventricular extension. Adjacent mass effect with left to right midline shift of 4 mm. Effacement of the suprasellar cistern.   Patient is also found to have SBP ~ 200. Patient was intubated for airway protection and started on nicardipine drip.       AMS with RUE and RLE weakness likely 2/2 ICH likely 2/2 HTN Emergency   - CTH: 4.2 cm frontotemporal intraparenchymal hemorrhage with intraventricular extension. Adjacent mass effect with left to right midline shift of 4 mm. Effacement of the suprasellar cistern.  - start on Keppra 1000mg BID  - Start on 3% NS @ 15cc/hr (target serum Na > 140)  - repeat CTH in 2 hours   - BP control with nicardipine drip (target SBP < 140)   - HOB elevated 30 degree   - f/u NeuroSurgery   - f/u Neurology     Anemia   - monitor Hb     Prostate cancer  - outpatient f/u.     DVT ppx: SCD  GI ppx: PPI   Activity: Bedrest  Diet: NPO 85 yo M with hx of CVA (ICH) 3 years ago with no residual deficits, Prostate cancer, Anemia presents to ED for right sided weakness started 6 pm. As per son at bedside, patient was driving to [employer] and felt "not right". So he pulled over to the side and he couldn't get out of the car because of weakness. So wife called EMS who brought him. Patient is not taking ASA or AC at home.   No hx of trauma.   In ED, patient was found to have 4.2 cm frontotemporal intraparenchymal hemorrhage with intraventricular extension. Adjacent mass effect with left to right midline shift of 4 mm. Effacement of the suprasellar cistern.   Patient is also found to have SBP ~ 200. Patient was intubated for airway protection and started on nicardipine drip.       AMS with RUE and RLE weakness likely 2/2 ICH likely 2/2 HTN Emergency   - CTH: 4.2 cm frontotemporal intraparenchymal hemorrhage with intraventricular extension. Adjacent mass effect with left to right midline shift of 4 mm. Effacement of the suprasellar cistern.  - start on Keppra 1000mg BID  - Start on 3% NS @ 15cc/hr (target serum Na > 140)  - monitor BMP   - repeat CTH in 2 hours   - BP control with nicardipine drip (target SBP < 140)   - HOB elevated 30 degree   - f/u NeuroSurgery   - f/u Neurology     Anemia   - monitor Hb     Prostate cancer  - outpatient f/u.     DVT ppx: SCD  GI ppx: PPI   Activity: Bedrest  Diet: NPO 87 yo M with hx of CVA (ICH) 3 years ago with no residual deficits, Prostate cancer, Anemia presents to ED for right sided weakness started 6 pm. As per son at bedside, patient was driving to [employer] and felt "not right". So he pulled over to the side and he couldn't get out of the car because of weakness. So wife called EMS who brought him. Patient is not taking ASA or AC at home.   No hx of trauma.   In ED, patient was found to have 4.2 cm frontotemporal intraparenchymal hemorrhage with intraventricular extension. Adjacent mass effect with left to right midline shift of 4 mm. Effacement of the suprasellar cistern.   Patient is also found to have SBP ~ 200. Patient was intubated for airway protection and started on nicardipine drip.       AMS with RUE and RLE weakness likely 2/2 ICH likely 2/2 HTN Emergency   - CTH: 4.2 cm frontotemporal intraparenchymal hemorrhage with intraventricular extension. Adjacent mass effect with left to right midline shift of 4 mm. Effacement of the suprasellar cistern.  - start on Keppra 1000mg BID  - Start on 3% NS @ 15cc/hr (target serum Na > 140)  - monitor BMP   - repeat CTH in 2 hours   - BP control with nicardipine drip (target SBP < 140)   - HOB elevated 30 degree   - f/u NeuroSurgery   - f/u Neurology     LLE cellulitis with abscess  - Burn consulted.   - f/u BCx, Wound Cx   - Start on Vancomycin      Anemia   - monitor Hb     Prostate cancer  - outpatient f/u.     DVT ppx: SCD  GI ppx: PPI   Activity: Bedrest  Diet: NPO 87 yo M with hx of CVA (ICH) 3 years ago with no residual deficits, Prostate cancer, Anemia presents to ED for right sided weakness started 6 pm. As per son at bedside, patient was driving to [employer] and felt "not right". So he pulled over to the side and he couldn't get out of the car because of weakness. So wife called EMS who brought him. Patient is not taking ASA or AC at home.   No hx of trauma.   In ED, patient was found to have 4.2 cm frontotemporal intraparenchymal hemorrhage with intraventricular extension. Adjacent mass effect with left to right midline shift of 4 mm. Effacement of the suprasellar cistern.   Patient is also found to have SBP ~ 200. Patient was intubated for airway protection and started on nicardipine drip.       AMS with RUE and RLE weakness likely 2/2 ICH likely 2/2 HTN Emergency   - CTH: 4.2 cm frontotemporal intraparenchymal hemorrhage with intraventricular extension. Adjacent mass effect with left to right midline shift of 4 mm. Effacement of the suprasellar cistern.  - start on Keppra 1000mg BID  - Start on 3% NS @ 15cc/hr (target serum Na > 140)  - monitor BMP   - repeat CTH in 2 hours   - BP control with nicardipine drip (target SBP < 140)   - HOB elevated 30 degree   - f/u NeuroSurgery   - f/u Neurology     LLE cellulitis with abscess  - Burn consulted.   - f/u BCx, Wound Cx   - Start on Vancomycin    - ID and Burn consulted.     Anemia   - monitor Hb     Prostate cancer  - outpatient f/u.     DVT ppx: SCD  GI ppx: PPI   Activity: Bedrest  Diet: NPO 85 yo M with hx of CVA (ICH) 3 years ago with no residual deficits, Prostate cancer, Anemia presents to ED for right sided weakness started 6 pm. As per son at bedside, patient was driving to [employer] and felt "not right". So he pulled over to the side and he couldn't get out of the car because of weakness. So wife called EMS who brought him. Patient is not taking ASA or AC at home.   No hx of trauma.   In ED, patient was found to have 4.2 cm frontotemporal intraparenchymal hemorrhage with intraventricular extension. Adjacent mass effect with left to right midline shift of 4 mm. Effacement of the suprasellar cistern.   Patient is also found to have SBP ~ 200. Patient was intubated for airway protection and started on nicardipine drip.       AMS with RUE and RLE weakness likely 2/2 ICH likely 2/2 HTN Emergency   - CTH: 4.2 cm frontotemporal intraparenchymal hemorrhage with intraventricular extension. Adjacent mass effect with left to right midline shift of 4 mm. Effacement of the suprasellar cistern.  - start on Keppra 1000mg BID  - Start on 3% NS @ 15cc/hr (target serum Na > 140)  - monitor BMP   - repeat CTH in 2 hours   - BP control with nicardipine drip (target SBP < 140)   - HOB elevated 30 degree   - f/u NeuroSurgery   - f/u Neurology     LLE cellulitis with abscess  - Burn consulted.   - f/u BCx, Wound Cx   - s/p Vancomycin x 1 in ED  - c/w Ancef 1g q8h  - ID and Burn consulted.     Anemia   - monitor Hb     Prostate cancer  - outpatient f/u.     DVT ppx: SCD  GI ppx: PPI   Activity: Bedrest  Diet: NPO

## 2020-01-17 NOTE — CONSULT NOTE ADULT - SUBJECTIVE AND OBJECTIVE BOX
INTERVAL HPI/OVERNIGHT EVENTS:    HPI:  85 y/o male with pMHx of CVA w/no residual effects, anemia, prostate ca presents with AMS and R sided weakness since 6pm today. Hx taken from family and wife due to pt being intubated/sedated at this time. Wife reports he was driving home with her after work and pt started driving a little erratically and wife asked him to pull over. When the car was stopped he became unresponsive and was rushed to ED. Denies any trauma. Not on AC or ASA.    PAST MEDICAL & SURGICAL HISTORY:  Prostate cancer  Anemia  No significant past surgical history      MEDICATIONS  (STANDING):  fentaNYL    Injectable 100 MICROGram(s) IV Push Once  levETIRAcetam  IVPB 1000 milliGRAM(s) IV Intermittent once  niCARdipine Infusion 5 mG/Hr (25 mL/Hr) IV Continuous <Continuous>  propofol Infusion 20 MICROgram(s)/kG/Min (9.24 mL/Hr) IV Continuous <Continuous>  sodium chloride 3%. 300 milliLiter(s) (1200 mL/Hr) IV Continuous <Continuous>    MEDICATIONS  (PRN):      Allergies    No Known Allergies    Intolerances          Vital Signs Last 24 Hrs  T(C): --  T(F): --  HR: 66 (17 Jan 2020 19:48) (57 - 81)  BP: 157/77 (17 Jan 2020 19:48) (157/77 - 220/120)  BP(mean): --  RR: 16 (17 Jan 2020 19:48) (16 - 20)  SpO2: 100% (17 Jan 2020 19:48) (100% - 100%)    PHYSICAL EXAM:    GENERAL: NAD, intubated/sedated.  HEAD:  Atraumatic, Normocephalic  EYES: PERRLA  NERVOUS SYSTEM:  Limited due to current status.      LABS:                        8.6    9.62  )-----------( 278      ( 17 Jan 2020 19:04 )             26.4     01-17    141  |  103  |  31<H>  ----------------------------<  157<H>  4.0   |  24  |  1.3    Ca    9.4      17 Jan 2020 19:04    TPro  7.2  /  Alb  4.0  /  TBili  0.4  /  DBili  x   /  AST  17  /  ALT  5   /  AlkPhos  45  01-17    PT/INR - ( 17 Jan 2020 19:04 )   PT: 12.90 sec;   INR: 1.12 ratio         PTT - ( 17 Jan 2020 19:04 )  PTT:26.8 sec      RADIOLOGY & ADDITIONAL TESTS:  < from: CT Brain Stroke Protocol (01.17.20 @ 19:13) >    ******PRELIMINARY REPORT******    ******PRELIMINARY REPORT******          EXAM:  CT BRAIN STROKE PROTOCOL            PROCEDURE DATE:  01/17/2020          ******PRELIMINARY REPORT******    ******PRELIMINARY REPORT******          INTERPRETATION:  Clinical History / Reason for exam: Code stroke    Technique: Noncontrast head CT.  Contiguous unenhanced CT axial images of the head from the base to the vertex with coronal and sagittal reformats.    Comparison: Noncontrast head CT 8/23/2018    Findings:    Large 4.2 cm frontotemporal intraparenchymal hemorrhage with surrounding edema and effacement of the adjacent cortical sulci. There is extensive mass effect on the adjacent left lateral ventricle. There is intraventricular extension into the lateral, third, and fourth ventricles. There is effacement of the suprasellar cistern. There is left to right midline shift of approximately 4 mm.    Mildly enlarged ventricles.    The calvarium is intact.    The visualized paranasal sinuses and mastoidsare well aerated. Dental hardware.    IMPRESSION:     4.2 cm frontotemporal intraparenchymal hemorrhage with intraventricular extension. Adjacent mass effect with left to right midline shift of 4 mm.     Effacement of the suprasellar cistern.    Dr. Davion Mcmullen discussed preliminary findings with WIL GUADARRAMA NP on 1/17/2020 7:15 PM with readback.            ******PRELIMINARY REPORT******    ******PRELIMINARY REPORT******          DAVION MCMULLEN M.D., RESIDENT RADIOLOGIST                      < end of copied text >

## 2020-01-17 NOTE — H&P ADULT - NSHPPHYSICALEXAM_GEN_ALL_CORE
PHYSICAL EXAM:  GEN: No acute distress  HEENT: No sclera icterus.   LUNGS: Clear to auscultation bilaterally   HEART: S1/S2 present. RRR.   ABD: Soft, non-tender, non-distended. Bowel sounds present  EXT: No pitting edema.   NEURO:AOX3 PHYSICAL EXAM:  GEN: No acute distress  HEENT: No sclera icterus.   LUNGS: Clear to auscultation bilaterally   HEART: S1/S2 present. RRR.   ABD: Soft, non-tender, non-distended. Bowel sounds present  EXT: No pitting edema. LLE erythema and a pocket of abscess.   NEURO: sedated.

## 2020-01-17 NOTE — ED PROVIDER NOTE - PHYSICAL EXAMINATION
CONSTITUTIONAL: Only moving eyes.   SKIN: warm, dry  HEAD: Normocephalic; atraumatic.  EYES: PERRL, EOMI, no conjunctival erythema  ENT: No nasal discharge; airway clear.  NECK: Supple; non tender.  CARD: S1, S2 normal; no murmurs, gallops, or rubs. Regular rate and rhythm.   RESP: No wheezes, rales or rhonchi.  ABD: soft ntnd  EXT: Normal ROM.  No clubbing, cyanosis or edema.   LYMPH: No acute cervical adenopathy.  NEURO: AO x 0. Unable to respond to commands. Blinking eyes.

## 2020-01-17 NOTE — ED PROCEDURE NOTE - CPROC ED ARTER LINE DETAIL1
Connected to a pressurized flush line./Line was sutured in place./Positive blood return was obtained via the catheter./Hemostasis was achieved with direct pressure, and a dry sterile dressing applied./Using sterile technique, the correct location was identified, and a needle was inserted into the artery (specify in FT).

## 2020-01-17 NOTE — ED PROCEDURE NOTE - CPROC ED POST PROC CARE GUIDE1
Verbal/written post procedure instructions were given to patient/caregiver.

## 2020-01-18 PROBLEM — D64.9 ANEMIA, UNSPECIFIED: Chronic | Status: ACTIVE | Noted: 2018-08-24

## 2020-01-18 PROBLEM — C61 MALIGNANT NEOPLASM OF PROSTATE: Chronic | Status: ACTIVE | Noted: 2018-08-24

## 2020-01-18 LAB
ALBUMIN SERPL ELPH-MCNC: 3.7 G/DL — SIGNIFICANT CHANGE UP (ref 3.5–5.2)
ALP SERPL-CCNC: 39 U/L — SIGNIFICANT CHANGE UP (ref 30–115)
ALT FLD-CCNC: <5 U/L — SIGNIFICANT CHANGE UP (ref 0–41)
ANION GAP SERPL CALC-SCNC: 12 MMOL/L — SIGNIFICANT CHANGE UP (ref 7–14)
ANION GAP SERPL CALC-SCNC: 12 MMOL/L — SIGNIFICANT CHANGE UP (ref 7–14)
ANION GAP SERPL CALC-SCNC: 13 MMOL/L — SIGNIFICANT CHANGE UP (ref 7–14)
AST SERPL-CCNC: 16 U/L — SIGNIFICANT CHANGE UP (ref 0–41)
BASOPHILS # BLD AUTO: 0.02 K/UL — SIGNIFICANT CHANGE UP (ref 0–0.2)
BASOPHILS NFR BLD AUTO: 0.2 % — SIGNIFICANT CHANGE UP (ref 0–1)
BILIRUB SERPL-MCNC: 0.4 MG/DL — SIGNIFICANT CHANGE UP (ref 0.2–1.2)
BUN SERPL-MCNC: 24 MG/DL — HIGH (ref 10–20)
BUN SERPL-MCNC: 29 MG/DL — HIGH (ref 10–20)
BUN SERPL-MCNC: 29 MG/DL — HIGH (ref 10–20)
CALCIUM SERPL-MCNC: 8.9 MG/DL — SIGNIFICANT CHANGE UP (ref 8.5–10.1)
CALCIUM SERPL-MCNC: 8.9 MG/DL — SIGNIFICANT CHANGE UP (ref 8.5–10.1)
CALCIUM SERPL-MCNC: 9.3 MG/DL — SIGNIFICANT CHANGE UP (ref 8.5–10.1)
CHLORIDE SERPL-SCNC: 103 MMOL/L — SIGNIFICANT CHANGE UP (ref 98–110)
CHLORIDE SERPL-SCNC: 105 MMOL/L — SIGNIFICANT CHANGE UP (ref 98–110)
CHLORIDE SERPL-SCNC: 105 MMOL/L — SIGNIFICANT CHANGE UP (ref 98–110)
CO2 SERPL-SCNC: 22 MMOL/L — SIGNIFICANT CHANGE UP (ref 17–32)
CO2 SERPL-SCNC: 23 MMOL/L — SIGNIFICANT CHANGE UP (ref 17–32)
CO2 SERPL-SCNC: 24 MMOL/L — SIGNIFICANT CHANGE UP (ref 17–32)
CREAT SERPL-MCNC: 1.1 MG/DL — SIGNIFICANT CHANGE UP (ref 0.7–1.5)
CREAT SERPL-MCNC: 1.2 MG/DL — SIGNIFICANT CHANGE UP (ref 0.7–1.5)
CREAT SERPL-MCNC: 1.2 MG/DL — SIGNIFICANT CHANGE UP (ref 0.7–1.5)
EOSINOPHIL # BLD AUTO: 0 K/UL — SIGNIFICANT CHANGE UP (ref 0–0.7)
EOSINOPHIL # BLD AUTO: 0.01 K/UL — SIGNIFICANT CHANGE UP (ref 0–0.7)
EOSINOPHIL # BLD AUTO: 0.02 K/UL — SIGNIFICANT CHANGE UP (ref 0–0.7)
EOSINOPHIL NFR BLD AUTO: 0 % — SIGNIFICANT CHANGE UP (ref 0–8)
EOSINOPHIL NFR BLD AUTO: 0.1 % — SIGNIFICANT CHANGE UP (ref 0–8)
EOSINOPHIL NFR BLD AUTO: 0.2 % — SIGNIFICANT CHANGE UP (ref 0–8)
GLUCOSE SERPL-MCNC: 142 MG/DL — HIGH (ref 70–99)
GLUCOSE SERPL-MCNC: 143 MG/DL — HIGH (ref 70–99)
GLUCOSE SERPL-MCNC: 149 MG/DL — HIGH (ref 70–99)
HCT VFR BLD CALC: 22.7 % — LOW (ref 42–52)
HCT VFR BLD CALC: 25.9 % — LOW (ref 42–52)
HCT VFR BLD CALC: 26.3 % — LOW (ref 42–52)
HGB BLD-MCNC: 7.2 G/DL — LOW (ref 14–18)
HGB BLD-MCNC: 8.3 G/DL — LOW (ref 14–18)
HGB BLD-MCNC: 8.4 G/DL — LOW (ref 14–18)
IMM GRANULOCYTES NFR BLD AUTO: 0.4 % — HIGH (ref 0.1–0.3)
IMM GRANULOCYTES NFR BLD AUTO: 0.4 % — HIGH (ref 0.1–0.3)
IMM GRANULOCYTES NFR BLD AUTO: 0.6 % — HIGH (ref 0.1–0.3)
LYMPHOCYTES # BLD AUTO: 0.85 K/UL — LOW (ref 1.2–3.4)
LYMPHOCYTES # BLD AUTO: 1 K/UL — LOW (ref 1.2–3.4)
LYMPHOCYTES # BLD AUTO: 1.02 K/UL — LOW (ref 1.2–3.4)
LYMPHOCYTES # BLD AUTO: 10.4 % — LOW (ref 20.5–51.1)
LYMPHOCYTES # BLD AUTO: 8.9 % — LOW (ref 20.5–51.1)
LYMPHOCYTES # BLD AUTO: 9.6 % — LOW (ref 20.5–51.1)
MAGNESIUM SERPL-MCNC: 1.9 MG/DL — SIGNIFICANT CHANGE UP (ref 1.8–2.4)
MAGNESIUM SERPL-MCNC: 1.9 MG/DL — SIGNIFICANT CHANGE UP (ref 1.8–2.4)
MCHC RBC-ENTMCNC: 29.5 PG — SIGNIFICANT CHANGE UP (ref 27–31)
MCHC RBC-ENTMCNC: 30.3 PG — SIGNIFICANT CHANGE UP (ref 27–31)
MCHC RBC-ENTMCNC: 31 PG — SIGNIFICANT CHANGE UP (ref 27–31)
MCHC RBC-ENTMCNC: 31.7 G/DL — LOW (ref 32–37)
MCHC RBC-ENTMCNC: 31.9 G/DL — LOW (ref 32–37)
MCHC RBC-ENTMCNC: 32 G/DL — SIGNIFICANT CHANGE UP (ref 32–37)
MCV RBC AUTO: 92.2 FL — SIGNIFICANT CHANGE UP (ref 80–94)
MCV RBC AUTO: 95.4 FL — HIGH (ref 80–94)
MCV RBC AUTO: 97 FL — HIGH (ref 80–94)
MONOCYTES # BLD AUTO: 0.47 K/UL — SIGNIFICANT CHANGE UP (ref 0.1–0.6)
MONOCYTES # BLD AUTO: 0.54 K/UL — SIGNIFICANT CHANGE UP (ref 0.1–0.6)
MONOCYTES # BLD AUTO: 0.67 K/UL — HIGH (ref 0.1–0.6)
MONOCYTES NFR BLD AUTO: 5.3 % — SIGNIFICANT CHANGE UP (ref 1.7–9.3)
MONOCYTES NFR BLD AUTO: 5.6 % — SIGNIFICANT CHANGE UP (ref 1.7–9.3)
MONOCYTES NFR BLD AUTO: 5.9 % — SIGNIFICANT CHANGE UP (ref 1.7–9.3)
MRSA PCR RESULT.: NEGATIVE — SIGNIFICANT CHANGE UP
NEUTROPHILS # BLD AUTO: 7.41 K/UL — HIGH (ref 1.4–6.5)
NEUTROPHILS # BLD AUTO: 7.98 K/UL — HIGH (ref 1.4–6.5)
NEUTROPHILS # BLD AUTO: 9.63 K/UL — HIGH (ref 1.4–6.5)
NEUTROPHILS NFR BLD AUTO: 83.4 % — HIGH (ref 42.2–75.2)
NEUTROPHILS NFR BLD AUTO: 84.2 % — HIGH (ref 42.2–75.2)
NEUTROPHILS NFR BLD AUTO: 84.4 % — HIGH (ref 42.2–75.2)
NRBC # BLD: 0 /100 WBCS — SIGNIFICANT CHANGE UP (ref 0–0)
PLATELET # BLD AUTO: 233 K/UL — SIGNIFICANT CHANGE UP (ref 130–400)
PLATELET # BLD AUTO: 236 K/UL — SIGNIFICANT CHANGE UP (ref 130–400)
PLATELET # BLD AUTO: 242 K/UL — SIGNIFICANT CHANGE UP (ref 130–400)
POTASSIUM SERPL-MCNC: 3.9 MMOL/L — SIGNIFICANT CHANGE UP (ref 3.5–5)
POTASSIUM SERPL-MCNC: 4.2 MMOL/L — SIGNIFICANT CHANGE UP (ref 3.5–5)
POTASSIUM SERPL-MCNC: 4.3 MMOL/L — SIGNIFICANT CHANGE UP (ref 3.5–5)
POTASSIUM SERPL-SCNC: 3.9 MMOL/L — SIGNIFICANT CHANGE UP (ref 3.5–5)
POTASSIUM SERPL-SCNC: 4.2 MMOL/L — SIGNIFICANT CHANGE UP (ref 3.5–5)
POTASSIUM SERPL-SCNC: 4.3 MMOL/L — SIGNIFICANT CHANGE UP (ref 3.5–5)
PROT SERPL-MCNC: 6.5 G/DL — SIGNIFICANT CHANGE UP (ref 6–8)
RBC # BLD: 2.38 M/UL — LOW (ref 4.7–6.1)
RBC # BLD: 2.71 M/UL — LOW (ref 4.7–6.1)
RBC # BLD: 2.81 M/UL — LOW (ref 4.7–6.1)
RBC # FLD: 18.6 % — HIGH (ref 11.5–14.5)
RBC # FLD: 18.6 % — HIGH (ref 11.5–14.5)
RBC # FLD: 19.5 % — HIGH (ref 11.5–14.5)
SODIUM SERPL-SCNC: 139 MMOL/L — SIGNIFICANT CHANGE UP (ref 135–146)
SODIUM SERPL-SCNC: 139 MMOL/L — SIGNIFICANT CHANGE UP (ref 135–146)
SODIUM SERPL-SCNC: 141 MMOL/L — SIGNIFICANT CHANGE UP (ref 135–146)
WBC # BLD: 11.4 K/UL — HIGH (ref 4.8–10.8)
WBC # BLD: 8.81 K/UL — SIGNIFICANT CHANGE UP (ref 4.8–10.8)
WBC # BLD: 9.58 K/UL — SIGNIFICANT CHANGE UP (ref 4.8–10.8)
WBC # FLD AUTO: 11.4 K/UL — HIGH (ref 4.8–10.8)
WBC # FLD AUTO: 8.81 K/UL — SIGNIFICANT CHANGE UP (ref 4.8–10.8)
WBC # FLD AUTO: 9.58 K/UL — SIGNIFICANT CHANGE UP (ref 4.8–10.8)

## 2020-01-18 PROCEDURE — 99291 CRITICAL CARE FIRST HOUR: CPT

## 2020-01-18 PROCEDURE — 70450 CT HEAD/BRAIN W/O DYE: CPT | Mod: 26

## 2020-01-18 PROCEDURE — 99231 SBSQ HOSP IP/OBS SF/LOW 25: CPT

## 2020-01-18 PROCEDURE — 71045 X-RAY EXAM CHEST 1 VIEW: CPT | Mod: 26

## 2020-01-18 PROCEDURE — 99251: CPT

## 2020-01-18 PROCEDURE — 99292 CRITICAL CARE ADDL 30 MIN: CPT

## 2020-01-18 PROCEDURE — 99221 1ST HOSP IP/OBS SF/LOW 40: CPT

## 2020-01-18 RX ORDER — CHLORHEXIDINE GLUCONATE 213 G/1000ML
1 SOLUTION TOPICAL
Refills: 0 | Status: DISCONTINUED | OUTPATIENT
Start: 2020-01-18 | End: 2020-01-20

## 2020-01-18 RX ORDER — VANCOMYCIN HCL 1 G
1000 VIAL (EA) INTRAVENOUS EVERY 12 HOURS
Refills: 0 | Status: DISCONTINUED | OUTPATIENT
Start: 2020-01-18 | End: 2020-01-18

## 2020-01-18 RX ORDER — CEFAZOLIN SODIUM 1 G
1000 VIAL (EA) INJECTION EVERY 8 HOURS
Refills: 0 | Status: DISCONTINUED | OUTPATIENT
Start: 2020-01-18 | End: 2020-01-20

## 2020-01-18 RX ORDER — CHLORHEXIDINE GLUCONATE 213 G/1000ML
15 SOLUTION TOPICAL EVERY 12 HOURS
Refills: 0 | Status: DISCONTINUED | OUTPATIENT
Start: 2020-01-18 | End: 2020-01-20

## 2020-01-18 RX ORDER — ACETAMINOPHEN 500 MG
650 TABLET ORAL EVERY 6 HOURS
Refills: 0 | Status: DISCONTINUED | OUTPATIENT
Start: 2020-01-18 | End: 2020-01-20

## 2020-01-18 RX ORDER — VANCOMYCIN HCL 1 G
1500 VIAL (EA) INTRAVENOUS ONCE
Refills: 0 | Status: COMPLETED | OUTPATIENT
Start: 2020-01-18 | End: 2020-01-18

## 2020-01-18 RX ORDER — VANCOMYCIN HCL 1 G
VIAL (EA) INTRAVENOUS
Refills: 0 | Status: DISCONTINUED | OUTPATIENT
Start: 2020-01-18 | End: 2020-01-18

## 2020-01-18 RX ADMIN — Medication 300 MILLIGRAM(S): at 04:18

## 2020-01-18 RX ADMIN — PANTOPRAZOLE SODIUM 40 MILLIGRAM(S): 20 TABLET, DELAYED RELEASE ORAL at 12:15

## 2020-01-18 RX ADMIN — CHLORHEXIDINE GLUCONATE 1 APPLICATION(S): 213 SOLUTION TOPICAL at 06:40

## 2020-01-18 RX ADMIN — Medication 650 MILLIGRAM(S): at 17:09

## 2020-01-18 RX ADMIN — LEVETIRACETAM 400 MILLIGRAM(S): 250 TABLET, FILM COATED ORAL at 06:40

## 2020-01-18 RX ADMIN — Medication 100 MILLIGRAM(S): at 15:10

## 2020-01-18 RX ADMIN — Medication 100 MILLIGRAM(S): at 08:47

## 2020-01-18 RX ADMIN — CHLORHEXIDINE GLUCONATE 15 MILLILITER(S): 213 SOLUTION TOPICAL at 17:19

## 2020-01-18 RX ADMIN — Medication 100 MILLIGRAM(S): at 21:50

## 2020-01-18 RX ADMIN — LEVETIRACETAM 400 MILLIGRAM(S): 250 TABLET, FILM COATED ORAL at 17:19

## 2020-01-18 RX ADMIN — CHLORHEXIDINE GLUCONATE 15 MILLILITER(S): 213 SOLUTION TOPICAL at 06:40

## 2020-01-18 NOTE — CONSULT NOTE ADULT - COMMENTS
On vent, FiO2 30%, sedated, responds to painful stimuli  R IJ catheter with no erythema. No diarrhea

## 2020-01-18 NOTE — PROGRESS NOTE ADULT - SUBJECTIVE AND OBJECTIVE BOX
Patient was seen and examined. Spoke with RN. Chart reviewed.    No events overnight.  Vital Signs Last 24 Hrs  T(F): 98.8 (18 Jan 2020 08:00), Max: 98.8 (18 Jan 2020 08:00)  HR: 58 (18 Jan 2020 12:00) (54 - 81)  BP: 101/54 (18 Jan 2020 08:00) (90/49 - 220/120)  SpO2: 100% (18 Jan 2020 12:00) (96% - 100%)  MEDICATIONS  (STANDING):  ceFAZolin   IVPB 1000 milliGRAM(s) IV Intermittent every 8 hours  chlorhexidine 0.12% Liquid 15 milliLiter(s) Oral Mucosa every 12 hours  chlorhexidine 4% Liquid 1 Application(s) Topical <User Schedule>  fentaNYL    Injectable 100 MICROGram(s) IV Push Once  levETIRAcetam  IVPB 1000 milliGRAM(s) IV Intermittent every 12 hours  niCARdipine Infusion 5 mG/Hr (25 mL/Hr) IV Continuous <Continuous>  pantoprazole  Injectable 40 milliGRAM(s) IV Push daily  propofol Infusion 20 MICROgram(s)/kG/Min (9.24 mL/Hr) IV Continuous <Continuous>  sodium chloride 3%. 500 milliLiter(s) (15 mL/Hr) IV Continuous <Continuous>    MEDICATIONS  (PRN):    Labs:                        7.2    8.81  )-----------( 233      ( 18 Jan 2020 04:30 )             22.7                         8.4    11.40 )-----------( 242      ( 18 Jan 2020 00:25 )             26.3     18 Jan 2020 04:30    141    |  105    |  29     ----------------------------<  142    4.3     |  24     |  1.1    18 Jan 2020 00:25    139    |  103    |  29     ----------------------------<  149    4.2     |  23     |  1.2      Ca    8.9        18 Jan 2020 04:30  Ca    9.3        18 Jan 2020 00:25  Mg     1.9       18 Jan 2020 04:30  Mg     1.9       18 Jan 2020 00:25    TPro  6.5    /  Alb  3.7    /  TBili  0.4    /  DBili  x      /  AST  16     /  ALT  <5     /  AlkPhos  39     18 Jan 2020 04:30  TPro  7.2    /  Alb  4.0    /  TBili  0.4    /  DBili  x      /  AST  17     /  ALT  5      /  AlkPhos  45     17 Jan 2020 19:04    PT/INR - ( 17 Jan 2020 19:04 )   PT: 12.90 sec;   INR: 1.12 ratio         PTT - ( 17 Jan 2020 19:04 )  PTT:26.8 sec        Radiology:    General: comfortable, NAD  Neurology: intubated/  Head:  Normocephalic, atraumatic  ENT:  Mucosa moist, no ulcerations et  Neck:  Supple, no JVD,   Skin: no breakdowns (as per RN)  Resp: decreased  CV: RRR, S1S2,   GI: Soft, NT, bowel sounds  MS: lle erythema/indur, + peripheral pulses, FROM all 4 extremity

## 2020-01-18 NOTE — PROGRESS NOTE ADULT - ASSESSMENT
AMS with RUE and RLE weakness likely 2/2 ICH likely 2/2 HTN Emergency   LLE cellulitis with abscess  prostate ca    iv nsaline 3percent  neuro fu  neousro surg apprec  iv abx    fu cx   id apprec  woundcare  supp care   guarded prog   mgmt as per crirical care

## 2020-01-18 NOTE — PROGRESS NOTE ADULT - ASSESSMENT
87 yo M with hx of CVA (ICH) 3 years ago with no residual deficits, Prostate cancer, Anemia presents to ED for right sided weakness started 6 pm. As per son at bedside, patient was driving to [employer] and felt "not right". So he pulled over to the side and he couldn't get out of the car because of weakness. So wife called EMS who brought him. Patient is not taking ASA or AC at home.   No hx of trauma.   In ED, patient was found to have 4.2 cm frontotemporal intraparenchymal hemorrhage with intraventricular extension. Adjacent mass effect with left to right midline shift of 4 mm. Effacement of the suprasellar cistern.   Patient is also found to have SBP ~ 200. Patient was intubated for airway protection and started on nicardipine drip.       AMS with RUE and RLE weakness likely 2/2 ICH likely 2/2 HTN Emergency   - CTH: 4.2 cm frontotemporal intraparenchymal hemorrhage with intraventricular extension. Adjacent mass effect with left to right midline shift of 4 mm. Effacement of the suprasellar cistern.  - on Keppra 1000mg BID  - on 3% NS @ 15cc/hr (target serum Na > 140)  - monitor BMP    - BP control with nicardipine drip (target SBP < 140)   - HOB elevated 30 degree   - f/u NeuroSurgery   - f/u Neurology     LLE cellulitis with abscess    - f/u BCx, Wound Cx   - s/p Vancomycin x 1 in ED  - c/w Ancef 1g q8h  - ID and Burn consulted.     Anemia   - monitor Hb     Prostate cancer  - outpatient f/u.     DVT ppx: SCD  GI ppx: PPI   Activity: Bedrest  Diet: NPO

## 2020-01-18 NOTE — CONSULT NOTE ADULT - ASSESSMENT
87 yo M with hx of CVA (ICH) 3 years ago with no residual deficits, Prostate cancer, Anemia presents to ED for right sided weakness started 6 pm. As per son at bedside, patient was driving to [employer] and felt "not right". So he pulled over to the side and he couldn't get out of the car because of weakness. So wife called EMS who brought him. Patient is not taking ASA or AC at home.   No hx of trauma.   In ED, patient was found to have 4.2 cm frontotemporal intraparenchymal hemorrhage with intraventricular extension. Adjacent mass effect with left to right midline shift of 4 mm. Effacement of the suprasellar cistern.    IMPRESSION:  # LLE possible trauma related hematoma with mild cellulitis. No abscess  -no sepsis  #Left frontotemporal parenchymal bleed ( increased on repeat CT ) with shift    RECOMMENDATIONS:  -f/u with Sx  -Ancef 1 gm iv q8h  -d/c vancomycin

## 2020-01-18 NOTE — CONSULT NOTE ADULT - SUBJECTIVE AND OBJECTIVE BOX
pt with traumatic wound left lower leg 1 week age-> edema and erythema decrease    PE: left lower leg--> mild edema and erythema surrounding 3x2cm wound--> culture sent

## 2020-01-18 NOTE — CONSULT NOTE ADULT - SUBJECTIVE AND OBJECTIVE BOX
ADRIAN FENG  86y, Male  Allergy: No Known Allergies      All historical available data reviewed.    HPI:  87 yo M with hx of CVA (ICH) 3 years ago with no residual deficits, Prostate cancer, Anemia presents to ED for right sided weakness started 6 pm. As per son at bedside, patient was driving to [employer] and felt "not right". So he pulled over to the side and he couldn't get out of the car because of weakness. So wife called EMS who brought him. Patient is not taking ASA or AC at home.   No hx of trauma.     In ED, patient was found to have 4.2 cm frontotemporal intraparenchymal hemorrhage with intraventricular extension. Adjacent mass effect with left to right midline shift of 4 mm. Effacement of the suprasellar cistern.  Patient is also found to have SBP ~ 200. Patient was intubated for airway protection and started on nicardipine drip. (17 Jan 2020 22:24)  ID called to evaluate LLE cellulitis    FAMILY HISTORY:    PAST MEDICAL & SURGICAL HISTORY:  CVA (cerebral vascular accident)  Prostate cancer  Anemia        VITALS:  T(F): 98.6, Max: 98.6 (01-18-20 @ 01:45)  HR: 58  BP: 90/49  RR: 17Vital Signs Last 24 Hrs  T(C): 37 (18 Jan 2020 01:45), Max: 37 (18 Jan 2020 01:45)  T(F): 98.6 (18 Jan 2020 01:45), Max: 98.6 (18 Jan 2020 01:45)  HR: 58 (18 Jan 2020 02:15) (54 - 81)  BP: 90/49 (18 Jan 2020 02:00) (90/49 - 220/120)  BP(mean): 62 (18 Jan 2020 02:00) (62 - 82)  RR: 17 (18 Jan 2020 02:15) (16 - 20)  SpO2: 96% (18 Jan 2020 02:15) (96% - 100%)    TESTS & MEASUREMENTS:                        7.2    8.81  )-----------( 233      ( 18 Jan 2020 04:30 )             22.7     01-18    139  |  103  |  29<H>  ----------------------------<  149<H>  4.2   |  23  |  1.2    Ca    9.3      18 Jan 2020 00:25  Mg     1.9     01-18    TPro  7.2  /  Alb  4.0  /  TBili  0.4  /  DBili  x   /  AST  17  /  ALT  5   /  AlkPhos  45  01-17    LIVER FUNCTIONS - ( 17 Jan 2020 19:04 )  Alb: 4.0 g/dL / Pro: 7.2 g/dL / ALK PHOS: 45 U/L / ALT: 5 U/L / AST: 17 U/L / GGT: x                   RADIOLOGY & ADDITIONAL TESTS:  Personal review of radiological diagnostics performed  Echo and EKG results noted when applicable.     MEDICATIONS:  chlorhexidine 0.12% Liquid 15 milliLiter(s) Oral Mucosa every 12 hours  chlorhexidine 4% Liquid 1 Application(s) Topical <User Schedule>  fentaNYL    Injectable 100 MICROGram(s) IV Push Once  levETIRAcetam  IVPB 1000 milliGRAM(s) IV Intermittent every 12 hours  niCARdipine Infusion 5 mG/Hr IV Continuous <Continuous>  pantoprazole  Injectable 40 milliGRAM(s) IV Push daily  propofol Infusion 20 MICROgram(s)/kG/Min IV Continuous <Continuous>  sodium chloride 3%. 500 milliLiter(s) IV Continuous <Continuous>  vancomycin  IVPB      vancomycin  IVPB 1000 milliGRAM(s) IV Intermittent every 12 hours      ANTIBIOTICS:  vancomycin  IVPB      vancomycin  IVPB 1000 milliGRAM(s) IV Intermittent every 12 hours

## 2020-01-18 NOTE — CONSULT NOTE ADULT - ASSESSMENT
traumatic wound left lower leg--> culture sent    rec: soap and water qd, xeroform gauze, kerlex, ace wrap dressing change bid    no surgery needed

## 2020-01-18 NOTE — PROGRESS NOTE ADULT - SUBJECTIVE AND OBJECTIVE BOX
Patient seen and examined.  Imaging reviewed.    Given patient's age, I do not believe a minimally invasive clot evacuation is advised.  I recommend medical management at this time.  BP control, 3% for sodium 145-155.  Plan per neurology.

## 2020-01-18 NOTE — PATIENT PROFILE ADULT - LEGAL HELP
Pre Op Teaching Flowsheet       Pre and Post op Patient Education  Relevant Diagnosis:  Renal stone (Right)  Surgical procedure:  Cystoscopy, right ureteroscopy, laser lithotripsy with stent placement  Teaching Topic:  Pre and post op teaching  Person Involved in teaching: Grace Dominguez    Motivation Level:  Asks Questions: Yes  Eager to Learn:  Yes  Cooperative: Yes  Receptive (willing/able to accept information):  Yes    Patient demonstrates understanding of the following:  Date of surgery:  2/1/18  Location of surgery:  [facility] [place]- 5th Floor  History and Physical and any other testing necessary prior to surgery: Yes  Required time line for completion of History and Physical and any pre-op testing: Yes    Patient demonstrates understanding of the following:  Pre-op bowel prep:  N/A  Pre-op showering/scrub information with PCMX Soap: Yes  Blood thinner medications discussed and when to stop (if applicable):  Yes      Infection Prevention:   Patient demonstrates understanding of the following:  Surgical procedure site care taught: Yes  Signs and symptoms of infection taught:  Yes      Post-op follow-up:  Discussed how to contact the hospital, nurse, and clinic scheduling staff if necessary.    Instructional materials used/given/mailed:  [place] [facility] Booklet, post op teaching sheet, Map, Soap, and arrival/location information.    Surgical instructions packet given to patient in office:  Yes.  Patient to have her  as her responsible  and person to stay with her the first 24 hours post surgery. Patient verbalized understanding    
no

## 2020-01-18 NOTE — PROGRESS NOTE ADULT - SUBJECTIVE AND OBJECTIVE BOX
ICU Attending Progress Daily Note     18 Jan 2020 13:29  remain intubated   He has history of CVA (cerebral vascular accident)  Prostate cancer  Anemia    Interval event for past 24 hr:  ADRIAN FENG  86y had no event.   Current Complains:  ADRIAN FENG has no new complains  HPI:  85 yo M with hx of CVA (ICH) 3 years ago with no residual deficits, Prostate cancer, Anemia presents to ED for right sided weakness started 6 pm. As per son at bedside, patient was driving to [employer] and felt "not right". So he pulled over to the side and he couldn't get out of the car because of weakness. So wife called EMS who brought him. Patient is not taking ASA or AC at home.   No hx of trauma.     In ED, patient was found to have 4.2 cm frontotemporal intraparenchymal hemorrhage with intraventricular extension. Adjacent mass effect with left to right midline shift of 4 mm. Effacement of the suprasellar cistern.  Patient is also found to have SBP ~ 200. Patient was intubated for airway protection and started on nicardipine drip. (17 Jan 2020 22:24)    OBJECTIVE:  ICU Vital Signs Last 24 Hrs  T(C): 37.1 (18 Jan 2020 08:00), Max: 37.1 (18 Jan 2020 04:00)  T(F): 98.8 (18 Jan 2020 08:00), Max: 98.8 (18 Jan 2020 08:00)  HR: 58 (18 Jan 2020 12:00) (54 - 81)  BP: 101/54 (18 Jan 2020 08:00) (90/49 - 220/120)  BP(mean): 68 (18 Jan 2020 08:00) (62 - 82)  ABP: 128/52 (18 Jan 2020 12:00) (108/48 - 144/68)  ABP(mean): 78 (18 Jan 2020 12:00) (68 - 94)  RR: 21 (18 Jan 2020 12:00) (16 - 30)  SpO2: 100% (18 Jan 2020 12:00) (96% - 100%)    I&O's Summary    17 Jan 2020 07:01  -  18 Jan 2020 07:00  --------------------------------------------------------  IN: 967.3 mL / OUT: 275 mL / NET: 692.3 mL    18 Jan 2020 07:01  -  18 Jan 2020 13:29  --------------------------------------------------------  IN: 75 mL / OUT: 200 mL / NET: -125 mL      I&O's Detail    17 Jan 2020 07:01  -  18 Jan 2020 07:00  --------------------------------------------------------  IN:    IV PiggyBack: 600 mL    niCARdipine Infusion: 157.5 mL    propofol Infusion: 119.8 mL    sodium chloride 3%.: 90 mL  Total IN: 967.3 mL    OUT:    Voided: 275 mL  Total OUT: 275 mL    Total NET: 692.3 mL      18 Jan 2020 07:01  -  18 Jan 2020 13:29  --------------------------------------------------------  IN:    sodium chloride 3%.: 75 mL  Total IN: 75 mL    OUT:    Voided: 200 mL  Total OUT: 200 mL    Total NET: -125 mL        Adult Advanced Hemodynamics Last 24 Hrs    Mode: AC/ CMV (Assist Control/ Continuous Mandatory Ventilation)  RR (machine): 22  TV (machine): 450  FiO2: 40  PEEP: 5  ITime: 1  MAP: 10  PIP: 23    CAPILLARY BLOOD GLUCOSE      POCT Blood Glucose.: 151 mg/dL (17 Jan 2020 19:03)    LABS:  ABG - ( 18 Jan 2020 04:05 )  pH, Arterial: 7.41  pH, Blood: x     /  pCO2: 39    /  pO2: 97    / HCO3: 25    / Base Excess: 0.5   /  SaO2: 99                                      7.2    8.81  )-----------( 233      ( 18 Jan 2020 04:30 )             22.7     01-18    141  |  105  |  29<H>  ----------------------------<  142<H>  4.3   |  24  |  1.1    Ca    8.9      18 Jan 2020 04:30  Mg     1.9     01-18    TPro  6.5  /  Alb  3.7  /  TBili  0.4  /  DBili  x   /  AST  16  /  ALT  <5  /  AlkPhos  39  01-18    PT/INR - ( 17 Jan 2020 19:04 )   PT: 12.90 sec;   INR: 1.12 ratio         PTT - ( 17 Jan 2020 19:04 )  PTT:26.8 sec      Home Medications:    HOSPITAL MEDICATIONS:  MEDICATIONS  (STANDING):  ceFAZolin   IVPB 1000 milliGRAM(s) IV Intermittent every 8 hours  chlorhexidine 0.12% Liquid 15 milliLiter(s) Oral Mucosa every 12 hours  chlorhexidine 4% Liquid 1 Application(s) Topical <User Schedule>  levETIRAcetam  IVPB 1000 milliGRAM(s) IV Intermittent every 12 hours  niCARdipine Infusion 5 mG/Hr (25 mL/Hr) IV Continuous <Continuous>  pantoprazole  Injectable 40 milliGRAM(s) IV Push daily  propofol Infusion 20 MICROgram(s)/kG/Min (9.24 mL/Hr) IV Continuous <Continuous>  sodium chloride 3%. 500 milliLiter(s) (15 mL/Hr) IV Continuous <Continuous>    MEDICATIONS  (PRN):        [ x ] Unable to assess ROS because intubated unresponsive    PHYSICAL EXAM:          CONSTITUTIONAL: Well-developed; well-nourished; in no acute distress.   	SKIN: warm, dry  	HEAD: Normocephalic; atraumatic.  	EYES: PERRL, EOM, no conj injection, sclera clear  	ENT: No nasal discharge; airway clear.  	NECK: Supple; non tender.  No midline ttp ctls  	CARD: S1, S2 normal; no murmurs, gallops, or rubs. Regular rate and rhythm. 2+ RPs and DPs bilat, no carotid bruits, no pedal   edema, no calf pain b/l  	RESP: CTA  bilat good air movement No wheezes, rales or rhonchi.  	ABD: Soft, not tender, not distended, no CVA ttp no rebound or guarding, bowel sounds present  	EXT: Normal ROM.  No clubbing, cyanosis or edema.   	  	NEURO: vent sedated        RADIOLOGY:  xray  < from: Xray Chest 1 View-PORTABLE IMMEDIATE (01.18.20 @ 07:23) >  Impression:      Support devices as described.    Stable minimal left basilar atelectasis. No focal consolidation, effusion, or pneumothorax.        < end of copied text >    I spent 45 minutes of critical care time examining patient, reviewing vitals, labs, medications, imaging and discussing with the team goals of care to prevent life-threatening in this patient who is at high risk for deterioration or death due to:

## 2020-01-19 LAB
ALBUMIN SERPL ELPH-MCNC: 3.7 G/DL — SIGNIFICANT CHANGE UP (ref 3.5–5.2)
ALP SERPL-CCNC: 41 U/L — SIGNIFICANT CHANGE UP (ref 30–115)
ALT FLD-CCNC: 8 U/L — SIGNIFICANT CHANGE UP (ref 0–41)
ANION GAP SERPL CALC-SCNC: 11 MMOL/L — SIGNIFICANT CHANGE UP (ref 7–14)
ANION GAP SERPL CALC-SCNC: 12 MMOL/L — SIGNIFICANT CHANGE UP (ref 7–14)
ANION GAP SERPL CALC-SCNC: 14 MMOL/L — SIGNIFICANT CHANGE UP (ref 7–14)
AST SERPL-CCNC: 38 U/L — SIGNIFICANT CHANGE UP (ref 0–41)
BASOPHILS # BLD AUTO: 0.03 K/UL — SIGNIFICANT CHANGE UP (ref 0–0.2)
BASOPHILS NFR BLD AUTO: 0.2 % — SIGNIFICANT CHANGE UP (ref 0–1)
BILIRUB SERPL-MCNC: 0.7 MG/DL — SIGNIFICANT CHANGE UP (ref 0.2–1.2)
BUN SERPL-MCNC: 24 MG/DL — HIGH (ref 10–20)
BUN SERPL-MCNC: 27 MG/DL — HIGH (ref 10–20)
BUN SERPL-MCNC: 27 MG/DL — HIGH (ref 10–20)
CALCIUM SERPL-MCNC: 8.4 MG/DL — LOW (ref 8.5–10.1)
CALCIUM SERPL-MCNC: 8.4 MG/DL — LOW (ref 8.5–10.1)
CALCIUM SERPL-MCNC: 9 MG/DL — SIGNIFICANT CHANGE UP (ref 8.5–10.1)
CHLORIDE SERPL-SCNC: 107 MMOL/L — SIGNIFICANT CHANGE UP (ref 98–110)
CHLORIDE SERPL-SCNC: 110 MMOL/L — SIGNIFICANT CHANGE UP (ref 98–110)
CHLORIDE SERPL-SCNC: 114 MMOL/L — HIGH (ref 98–110)
CO2 SERPL-SCNC: 21 MMOL/L — SIGNIFICANT CHANGE UP (ref 17–32)
CO2 SERPL-SCNC: 21 MMOL/L — SIGNIFICANT CHANGE UP (ref 17–32)
CO2 SERPL-SCNC: 22 MMOL/L — SIGNIFICANT CHANGE UP (ref 17–32)
CREAT SERPL-MCNC: 1 MG/DL — SIGNIFICANT CHANGE UP (ref 0.7–1.5)
CREAT SERPL-MCNC: 1 MG/DL — SIGNIFICANT CHANGE UP (ref 0.7–1.5)
CREAT SERPL-MCNC: 1.1 MG/DL — SIGNIFICANT CHANGE UP (ref 0.7–1.5)
EOSINOPHIL # BLD AUTO: 0 K/UL — SIGNIFICANT CHANGE UP (ref 0–0.7)
EOSINOPHIL NFR BLD AUTO: 0 % — SIGNIFICANT CHANGE UP (ref 0–8)
GLUCOSE SERPL-MCNC: 134 MG/DL — HIGH (ref 70–99)
GLUCOSE SERPL-MCNC: 143 MG/DL — HIGH (ref 70–99)
GLUCOSE SERPL-MCNC: 156 MG/DL — HIGH (ref 70–99)
HCT VFR BLD CALC: 27.2 % — LOW (ref 42–52)
HGB BLD-MCNC: 9.1 G/DL — LOW (ref 14–18)
IMM GRANULOCYTES NFR BLD AUTO: 0.4 % — HIGH (ref 0.1–0.3)
LYMPHOCYTES # BLD AUTO: 1.41 K/UL — SIGNIFICANT CHANGE UP (ref 1.2–3.4)
LYMPHOCYTES # BLD AUTO: 11.4 % — LOW (ref 20.5–51.1)
MAGNESIUM SERPL-MCNC: 1.9 MG/DL — SIGNIFICANT CHANGE UP (ref 1.8–2.4)
MCHC RBC-ENTMCNC: 31.3 PG — HIGH (ref 27–31)
MCHC RBC-ENTMCNC: 33.5 G/DL — SIGNIFICANT CHANGE UP (ref 32–37)
MCV RBC AUTO: 93.5 FL — SIGNIFICANT CHANGE UP (ref 80–94)
MONOCYTES # BLD AUTO: 0.65 K/UL — HIGH (ref 0.1–0.6)
MONOCYTES NFR BLD AUTO: 5.2 % — SIGNIFICANT CHANGE UP (ref 1.7–9.3)
NEUTROPHILS # BLD AUTO: 10.28 K/UL — HIGH (ref 1.4–6.5)
NEUTROPHILS NFR BLD AUTO: 82.8 % — HIGH (ref 42.2–75.2)
NRBC # BLD: 0 /100 WBCS — SIGNIFICANT CHANGE UP (ref 0–0)
PLATELET # BLD AUTO: 262 K/UL — SIGNIFICANT CHANGE UP (ref 130–400)
POTASSIUM SERPL-MCNC: 3.4 MMOL/L — LOW (ref 3.5–5)
POTASSIUM SERPL-MCNC: 3.5 MMOL/L — SIGNIFICANT CHANGE UP (ref 3.5–5)
POTASSIUM SERPL-MCNC: 3.7 MMOL/L — SIGNIFICANT CHANGE UP (ref 3.5–5)
POTASSIUM SERPL-SCNC: 3.4 MMOL/L — LOW (ref 3.5–5)
POTASSIUM SERPL-SCNC: 3.5 MMOL/L — SIGNIFICANT CHANGE UP (ref 3.5–5)
POTASSIUM SERPL-SCNC: 3.7 MMOL/L — SIGNIFICANT CHANGE UP (ref 3.5–5)
PROT SERPL-MCNC: 6.8 G/DL — SIGNIFICANT CHANGE UP (ref 6–8)
RBC # BLD: 2.91 M/UL — LOW (ref 4.7–6.1)
RBC # FLD: 19.9 % — HIGH (ref 11.5–14.5)
SODIUM SERPL-SCNC: 142 MMOL/L — SIGNIFICANT CHANGE UP (ref 135–146)
SODIUM SERPL-SCNC: 144 MMOL/L — SIGNIFICANT CHANGE UP (ref 135–146)
SODIUM SERPL-SCNC: 146 MMOL/L — SIGNIFICANT CHANGE UP (ref 135–146)
WBC # BLD: 12.42 K/UL — HIGH (ref 4.8–10.8)
WBC # FLD AUTO: 12.42 K/UL — HIGH (ref 4.8–10.8)

## 2020-01-19 PROCEDURE — 71045 X-RAY EXAM CHEST 1 VIEW: CPT | Mod: 26,76

## 2020-01-19 PROCEDURE — 99291 CRITICAL CARE FIRST HOUR: CPT

## 2020-01-19 RX ORDER — SODIUM CHLORIDE 5 G/100ML
500 INJECTION, SOLUTION INTRAVENOUS
Refills: 0 | Status: DISCONTINUED | OUTPATIENT
Start: 2020-01-19 | End: 2020-01-20

## 2020-01-19 RX ADMIN — SODIUM CHLORIDE 15 MILLILITER(S): 5 INJECTION, SOLUTION INTRAVENOUS at 01:05

## 2020-01-19 RX ADMIN — Medication 650 MILLIGRAM(S): at 15:49

## 2020-01-19 RX ADMIN — NICARDIPINE HYDROCHLORIDE 25 MG/HR: 30 CAPSULE, EXTENDED RELEASE ORAL at 01:04

## 2020-01-19 RX ADMIN — LEVETIRACETAM 400 MILLIGRAM(S): 250 TABLET, FILM COATED ORAL at 17:19

## 2020-01-19 RX ADMIN — PANTOPRAZOLE SODIUM 40 MILLIGRAM(S): 20 TABLET, DELAYED RELEASE ORAL at 12:10

## 2020-01-19 RX ADMIN — Medication 100 MILLIGRAM(S): at 22:22

## 2020-01-19 RX ADMIN — CHLORHEXIDINE GLUCONATE 1 APPLICATION(S): 213 SOLUTION TOPICAL at 06:56

## 2020-01-19 RX ADMIN — NICARDIPINE HYDROCHLORIDE 25 MG/HR: 30 CAPSULE, EXTENDED RELEASE ORAL at 22:23

## 2020-01-19 RX ADMIN — Medication 100 MILLIGRAM(S): at 06:56

## 2020-01-19 RX ADMIN — CHLORHEXIDINE GLUCONATE 15 MILLILITER(S): 213 SOLUTION TOPICAL at 17:20

## 2020-01-19 RX ADMIN — NICARDIPINE HYDROCHLORIDE 25 MG/HR: 30 CAPSULE, EXTENDED RELEASE ORAL at 12:16

## 2020-01-19 RX ADMIN — LEVETIRACETAM 400 MILLIGRAM(S): 250 TABLET, FILM COATED ORAL at 06:56

## 2020-01-19 RX ADMIN — CHLORHEXIDINE GLUCONATE 15 MILLILITER(S): 213 SOLUTION TOPICAL at 06:56

## 2020-01-19 RX ADMIN — PROPOFOL 9.24 MICROGRAM(S)/KG/MIN: 10 INJECTION, EMULSION INTRAVENOUS at 12:16

## 2020-01-19 RX ADMIN — Medication 100 MILLIGRAM(S): at 13:56

## 2020-01-19 RX ADMIN — SODIUM CHLORIDE 25 MILLILITER(S): 5 INJECTION, SOLUTION INTRAVENOUS at 12:17

## 2020-01-19 NOTE — DIETITIAN INITIAL EVALUATION ADULT. - ADD RECOMMEND
Recommendation: Change EN regimen to Osmolite 1.5 initiated at 10 mL/hr. Increase by 10 mL q4-6hrs towards goal rate 40 mL/hr. Order Prosource TF q8hrs. Regimen at goal to provide 1560 kcal, 93 g protein, 730 mL free H2O. Flushes per LIP. Monitor Mg/PO4/K throughout initiation of EN & replete as needed. Current propofol rate to add ~244 kcal/day if infused over 24 hour duration.

## 2020-01-19 NOTE — DIETITIAN INITIAL EVALUATION ADULT. - RD TO REMAIN AVAILABLE
yes/Nutrition Intervention: Enteral Nutrition. Monitor: Energy intake, diet order, glucose profile, renal profile, nutrition focused physical findings, body composition.

## 2020-01-19 NOTE — PROGRESS NOTE ADULT - SUBJECTIVE AND OBJECTIVE BOX
ADRIAN FENG  86y, Male    All available historical data reviewed    OVERNIGHT EVENTS:    fevers  ROS:  on vent, FiO2 40%  no pressors  sedated, does not follow commands  no diarrhea  R IJ catheter with no erythema    VITALS:  T(F): 99, Max: 102.1 (01-18-20 @ 16:00)  HR: 84  BP: 117/71  RR: 28Vital Signs Last 24 Hrs  T(C): 37.2 (18 Jan 2020 20:00), Max: 38.9 (18 Jan 2020 16:00)  T(F): 99 (18 Jan 2020 20:00), Max: 102.1 (18 Jan 2020 16:00)  HR: 84 (19 Jan 2020 03:00) (52 - 96)  BP: 117/71 (19 Jan 2020 03:00) (97/52 - 162/89)  BP(mean): 96 (19 Jan 2020 03:00) (67 - 123)  RR: 28 (19 Jan 2020 03:00) (9 - 30)  SpO2: 100% (19 Jan 2020 03:00) (100% - 100%)    TESTS & MEASUREMENTS:                        9.1    12.42 )-----------( 262      ( 19 Jan 2020 04:30 )             27.2     01-19    142  |  107  |  24<H>  ----------------------------<  156<H>  3.7   |  21  |  1.0    Ca    9.0      19 Jan 2020 04:30  Mg     1.9     01-19    TPro  6.8  /  Alb  3.7  /  TBili  0.7  /  DBili  x   /  AST  38  /  ALT  8   /  AlkPhos  41  01-19    LIVER FUNCTIONS - ( 19 Jan 2020 04:30 )  Alb: 3.7 g/dL / Pro: 6.8 g/dL / ALK PHOS: 41 U/L / ALT: 8 U/L / AST: 38 U/L / GGT: x                   RADIOLOGY & ADDITIONAL TESTS:  Personal review of radiological diagnostics performed  Echo and EKG results noted when applicable.     MEDICATIONS:  acetaminophen   Tablet .. 650 milliGRAM(s) Oral every 6 hours PRN  ceFAZolin   IVPB 1000 milliGRAM(s) IV Intermittent every 8 hours  chlorhexidine 0.12% Liquid 15 milliLiter(s) Oral Mucosa every 12 hours  chlorhexidine 4% Liquid 1 Application(s) Topical <User Schedule>  levETIRAcetam  IVPB 1000 milliGRAM(s) IV Intermittent every 12 hours  niCARdipine Infusion 5 mG/Hr IV Continuous <Continuous>  pantoprazole  Injectable 40 milliGRAM(s) IV Push daily  propofol Infusion 20 MICROgram(s)/kG/Min IV Continuous <Continuous>  sodium chloride 3%. 500 milliLiter(s) IV Continuous <Continuous>      ANTIBIOTICS:  ceFAZolin   IVPB 1000 milliGRAM(s) IV Intermittent every 8 hours

## 2020-01-19 NOTE — PROGRESS NOTE ADULT - ASSESSMENT
· Assessment		   87 yo M with hx of CVA (ICH) 3 years ago with no residual deficits, Prostate cancer, Anemia presents to ED for right sided weakness started 6 pm. As per son at bedside, patient was driving to [employer] and felt "not right". So he pulled over to the side and he couldn't get out of the car because of weakness. So wife called EMS who brought him. Patient is not taking ASA or AC at home.   No hx of trauma.   In ED, patient was found to have 4.2 cm frontotemporal intraparenchymal hemorrhage with intraventricular extension. Adjacent mass effect with left to right midline shift of 4 mm. Effacement of the suprasellar cistern.    IMPRESSION:  # LLE possible trauma related hematoma with mild cellulitis. No abscess  -no sepsis  -BURN : no debridement. Local care  #Left frontotemporal parenchymal bleed ( increased on repeat CT ) with shift  -fevers secondary to SIRS from the IPH    RECOMMENDATIONS:  -BCx  -Ancef 1 gm iv q8h

## 2020-01-19 NOTE — PROGRESS NOTE ADULT - SUBJECTIVE AND OBJECTIVE BOX
ADRIAN FENG 86y Male  MRN#: [id number]     SUBJECTIVE  Patient is a 86y old Male who presents with a chief complaint of ICH (19 Jan 2020 05:58)  Currently admitted to medicine with the primary diagnosis of Intracranial hemorrhage  Today is hospital day 2d, and this morning he is intubated and sedated. responsive to painful stimuli     OBJECTIVE  PAST MEDICAL & SURGICAL HISTORY  CVA (cerebral vascular accident)  Prostate cancer  Anemia    ALLERGIES:  No Known Allergies    MEDICATIONS:  STANDING MEDICATIONS  ceFAZolin   IVPB 1000 milliGRAM(s) IV Intermittent every 8 hours  chlorhexidine 0.12% Liquid 15 milliLiter(s) Oral Mucosa every 12 hours  chlorhexidine 4% Liquid 1 Application(s) Topical <User Schedule>  levETIRAcetam  IVPB 1000 milliGRAM(s) IV Intermittent every 12 hours  niCARdipine Infusion 5 mG/Hr IV Continuous <Continuous>  pantoprazole  Injectable 40 milliGRAM(s) IV Push daily  propofol Infusion 20 MICROgram(s)/kG/Min IV Continuous <Continuous>  sodium chloride 3%. 500 milliLiter(s) IV Continuous <Continuous>    PRN MEDICATIONS  acetaminophen   Tablet .. 650 milliGRAM(s) Oral every 6 hours PRN      VITAL SIGNS: Last 24 Hours  T(C): 37.2 (18 Jan 2020 20:00), Max: 38.9 (18 Jan 2020 16:00)  T(F): 99 (18 Jan 2020 20:00), Max: 102.1 (18 Jan 2020 16:00)  HR: 56 (19 Jan 2020 07:30) (52 - 98)  BP: 114/63 (19 Jan 2020 07:30) (96/52 - 162/89)  BP(mean): 84 (19 Jan 2020 07:30) (65 - 123)  RR: 21 (19 Jan 2020 07:30) (6 - 54)  SpO2: 100% (19 Jan 2020 07:30) (100% - 100%)    LABS:                        9.1    12.42 )-----------( 262      ( 19 Jan 2020 04:30 )             27.2     01-19    142  |  107  |  24<H>  ----------------------------<  156<H>  3.7   |  21  |  1.0    Ca    9.0      19 Jan 2020 04:30  Mg     1.9     01-19    TPro  6.8  /  Alb  3.7  /  TBili  0.7  /  DBili  x   /  AST  38  /  ALT  8   /  AlkPhos  41  01-19    PT/INR - ( 17 Jan 2020 19:04 )   PT: 12.90 sec;   INR: 1.12 ratio         PTT - ( 17 Jan 2020 19:04 )  PTT:26.8 sec    ABG - ( 19 Jan 2020 04:25 )  pH, Arterial: 7.45  pH, Blood: x     /  pCO2: 35    /  pO2: 132   / HCO3: 24    / Base Excess: 0.5   /  SaO2: 99        RADIOLOGY:  < from: CT Head No Cont (01.18.20 @ 01:54) >  MPRESSION:  Since CT head performed on January 17, 2020;    Mildly increased size of the left basal ganglia hematoma, currently measuring up to 5.7 cm, previously 5.0 cm. Redemonstrated extension of hemorrhage into the overlying subarachnoid space and into the ventricular system.    < end of copied text >    PHYSICAL EXAM:    GENERAL: Intubated and sedated.   HEENT:  Atraumatic, Normocephalic. EOMI, PERRLA, conjunctiva and sclera clear, No JVD  PULMONARY: Clear to auscultation bilaterally; No wheeze  CARDIOVASCULAR: Regular rate and rhythm; No murmurs, rubs, or gallops  GASTROINTESTINAL: Soft, Nontender, Nondistended; Bowel sounds present  MUSCULOSKELETAL: LLE cellulitis.   SKIN: No rashes or lesions    ASSESSMENT & PLAN  85 y/o male with a hx of CVA and ICH 3 years ago, with no deficients and a hx of prostate ca.   Presneting with right sided weakness.   CT barin showed: 4.3 cm frontotemporal intraparenchymal bleeding extending to ventricles with mass effect and midline shift. Patient was intubated for airway protection  and started on nicardipine drip for hypertensive emergency.     # hypertensive emergency with ICH, midline shift requiring intubation for airway protection.   - repeat CT showed worsening  of bleed  - on 3% NS at 15ml/hr  - keep BP<180  - on Keppra 1000mgBID  - no neurosurgery intervention     # LLE cellulitis  - on cefazolin (1/18)     DVT prophylaxis: SCDs  Diet: Diet, NPO:   Lines: right IJ and A-line ADRIAN FENG 86y Male  MRN#: [id number]     SUBJECTIVE  Patient is a 86y old Male who presents with a chief complaint of ICH (19 Jan 2020 05:58)  Currently admitted to medicine with the primary diagnosis of Intracranial hemorrhage  Today is hospital day 2d, and this morning he is intubated and sedated. responsive to painful stimuli     OBJECTIVE  PAST MEDICAL & SURGICAL HISTORY  CVA (cerebral vascular accident)  Prostate cancer  Anemia    ALLERGIES:  No Known Allergies    MEDICATIONS:  STANDING MEDICATIONS  ceFAZolin   IVPB 1000 milliGRAM(s) IV Intermittent every 8 hours  chlorhexidine 0.12% Liquid 15 milliLiter(s) Oral Mucosa every 12 hours  chlorhexidine 4% Liquid 1 Application(s) Topical <User Schedule>  levETIRAcetam  IVPB 1000 milliGRAM(s) IV Intermittent every 12 hours  niCARdipine Infusion 5 mG/Hr IV Continuous <Continuous>  pantoprazole  Injectable 40 milliGRAM(s) IV Push daily  propofol Infusion 20 MICROgram(s)/kG/Min IV Continuous <Continuous>  sodium chloride 3%. 500 milliLiter(s) IV Continuous <Continuous>    PRN MEDICATIONS  acetaminophen   Tablet .. 650 milliGRAM(s) Oral every 6 hours PRN      VITAL SIGNS: Last 24 Hours  T(C): 37.2 (18 Jan 2020 20:00), Max: 38.9 (18 Jan 2020 16:00)  T(F): 99 (18 Jan 2020 20:00), Max: 102.1 (18 Jan 2020 16:00)  HR: 56 (19 Jan 2020 07:30) (52 - 98)  BP: 114/63 (19 Jan 2020 07:30) (96/52 - 162/89)  BP(mean): 84 (19 Jan 2020 07:30) (65 - 123)  RR: 21 (19 Jan 2020 07:30) (6 - 54)  SpO2: 100% (19 Jan 2020 07:30) (100% - 100%)    LABS:                        9.1    12.42 )-----------( 262      ( 19 Jan 2020 04:30 )             27.2     01-19    142  |  107  |  24<H>  ----------------------------<  156<H>  3.7   |  21  |  1.0    Ca    9.0      19 Jan 2020 04:30  Mg     1.9     01-19    TPro  6.8  /  Alb  3.7  /  TBili  0.7  /  DBili  x   /  AST  38  /  ALT  8   /  AlkPhos  41  01-19    PT/INR - ( 17 Jan 2020 19:04 )   PT: 12.90 sec;   INR: 1.12 ratio         PTT - ( 17 Jan 2020 19:04 )  PTT:26.8 sec    ABG - ( 19 Jan 2020 04:25 )  pH, Arterial: 7.45  pH, Blood: x     /  pCO2: 35    /  pO2: 132   / HCO3: 24    / Base Excess: 0.5   /  SaO2: 99        RADIOLOGY:  < from: CT Head No Cont (01.18.20 @ 01:54) >  MPRESSION:  Since CT head performed on January 17, 2020;    Mildly increased size of the left basal ganglia hematoma, currently measuring up to 5.7 cm, previously 5.0 cm. Redemonstrated extension of hemorrhage into the overlying subarachnoid space and into the ventricular system.    < end of copied text >    PHYSICAL EXAM:    GENERAL: Intubated and sedated.   HEENT:  Atraumatic, Normocephalic. EOMI, PERRLA, conjunctiva and sclera clear, No JVD  PULMONARY: Clear to auscultation bilaterally; No wheeze  CARDIOVASCULAR: Regular rate and rhythm; No murmurs, rubs, or gallops  GASTROINTESTINAL: Soft, Nontender, Nondistended; Bowel sounds present  MUSCULOSKELETAL: LLE cellulitis.   SKIN: No rashes or lesions    ASSESSMENT & PLAN  87 y/o male with a hx of CVA and ICH 3 years ago, with no deficients and a hx of prostate ca.   Presneting with right sided weakness.   CT barin showed: 4.3 cm frontotemporal intraparenchymal bleeding extending to ventricles with mass effect and midline shift. Patient was intubated for airway protection  and started on nicardipine drip for hypertensive emergency.     # hypertensive emergency with ICH, midline shift requiring intubation for airway protection.   - repeat CT showed worsening  of bleed  - on 3% NS at 15ml/hr. Will increase fo 25ml/hr  - BMP at every round to monitor Na.   - keep BP<180  - on Keppra 1000mgBID  - no neurosurgery intervention     # LLE cellulitis  - on cefazolin (1/18)     DVT prophylaxis: SCDs  Diet: Diet, Will start tube feeding  Lines: right IJ and A-line

## 2020-01-19 NOTE — DIETITIAN INITIAL EVALUATION ADULT. - OTHER INFO
Pertinent Medical Information: p/w hypertensive emergency with ICH, midline shift requiring intubation for airway protection. L LE cellulitis noted.    Pertinent Subjective Information: Unable to obtain nutrition history at this time (pt intubated). NKFA per chart. Pertinent Medical Information: p/w hypertensive emergency with ICH, midline shift requiring intubation for airway protection.     Pertinent Subjective Information: Unable to obtain nutrition history at this time (pt intubated). NKFA per chart. Pertinent Medical Information: p/w hypertensive emergency with ICH, midline shift requiring intubation for airway protection. Latest /68 mmHg, mean 106 mmHg (1/19 17:45)    Pertinent Subjective Information: Unable to obtain nutrition history at this time (pt intubated). NKFA per chart.

## 2020-01-19 NOTE — DIETITIAN INITIAL EVALUATION ADULT. - DIET TYPE
Jevity 1.2 goal rate 45 mL/hr ordered 1/19. Regimen at goal to provide 1296 kcal, 60 g protein, 875 mL free H2O. EN not yet initiated at this time.

## 2020-01-19 NOTE — PROGRESS NOTE ADULT - SUBJECTIVE AND OBJECTIVE BOX
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 As per son at bedside, patient was [place] to [employer] and felt "not right". So he pulled over to the side and he couldn't get out of the car because of weakness. So wife called EMS who brought him. Patient is not taking ASA or AC at home.   No hx of trauma. In ED, patient was found to have 4.2 cm frontotemporal intraparenchymal hemorrhage with intraventricular extension. Adjacent mass effect with left to right midline shift of 4 mm. Effacement of the suprasellar cistern.Patient is also found to have SBP ~ 200. Patient was intubated for airway protection and started on nicardipine drip.       Vital Signs Last 24 Hrs  T(C): 38 (19 Jan 2020 15:54), Max: 38.9 (18 Jan 2020 16:00)  T(F): 100.4 (19 Jan 2020 15:54), Max: 102.1 (18 Jan 2020 16:00)  HR: 78 (19 Jan 2020 15:54) (52 - 98)  BP: 151/67 (19 Jan 2020 15:30) (92/51 - 162/89)  BP(mean): 97 (19 Jan 2020 15:30) (64 - 123)  RR: 11 (19 Jan 2020 15:54) (6 - 54)  SpO2: 100% (19 Jan 2020 15:54) (99% - 100%)    Neurological Exam:   Mechanically vented and sedated  Pupils 2mm and sluggish  Left gaze deviation  to central noxious stimuli patient extends all four extremities in  decerebrate posture fashion.   spontaneously awake blinking and coughing.   ICH score 5      Medications:  MEDICATIONS  (STANDING):  ceFAZolin   IVPB 1000 milliGRAM(s) IV Intermittent every 8 hours  chlorhexidine 0.12% Liquid 15 milliLiter(s) Oral Mucosa every 12 hours  chlorhexidine 4% Liquid 1 Application(s) Topical <User Schedule>  levETIRAcetam  IVPB 1000 milliGRAM(s) IV Intermittent every 12 hours  niCARdipine Infusion 5 mG/Hr (25 mL/Hr) IV Continuous <Continuous>  pantoprazole  Injectable 40 milliGRAM(s) IV Push daily  propofol Infusion 20 MICROgram(s)/kG/Min (9.24 mL/Hr) IV Continuous <Continuous>  sodium chloride 3%. 500 milliLiter(s) (25 mL/Hr) IV Continuous <Continuous>    MEDICATIONS  (PRN):  acetaminophen   Tablet .. 650 milliGRAM(s) Oral every 6 hours PRN Temp greater or equal to 38C (100.4F)      Labs:  CBC Full  -  ( 19 Jan 2020 04:30 )  WBC Count : 12.42 K/uL  RBC Count : 2.91 M/uL  Hemoglobin : 9.1 g/dL  Hematocrit : 27.2 %  Platelet Count - Automated : 262 K/uL  Mean Cell Volume : 93.5 fL  Mean Cell Hemoglobin : 31.3 pg  Mean Cell Hemoglobin Concentration : 33.5 g/dL  Auto Neutrophil # : 10.28 K/uL  Auto Lymphocyte # : 1.41 K/uL  Auto Monocyte # : 0.65 K/uL  Auto Eosinophil # : 0.00 K/uL  Auto Basophil # : 0.03 K/uL  Auto Neutrophil % : 82.8 %  Auto Lymphocyte % : 11.4 %  Auto Monocyte % : 5.2 %  Auto Eosinophil % : 0.0 %  Auto Basophil % : 0.2 %    01-19    144  |  110  |  27<H>  ----------------------------<  143<H>  3.5   |  22  |  1.0    Ca    8.4<L>      19 Jan 2020 11:00  Mg     1.9     01-19    TPro  6.8  /  Alb  3.7  /  TBili  0.7  /  DBili  x   /  AST  38  /  ALT  8   /  AlkPhos  41  01-19    LIVER FUNCTIONS - ( 19 Jan 2020 04:30 )  Alb: 3.7 g/dL / Pro: 6.8 g/dL / ALK PHOS: 41 U/L / ALT: 8 U/L / AST: 38 U/L / GGT: x           PT/INR - ( 17 Jan 2020 19:04 )   PT: 12.90 sec;   INR: 1.12 ratio         PTT - ( 17 Jan 2020 19:04 )  PTT:26.8 sec

## 2020-01-19 NOTE — PROGRESS NOTE ADULT - ASSESSMENT
Impression:  87 yo M with hx of CVA (ICH) 3 years ago with no residual deficits, Prostate cancer, Anemia presents to ED for right sided weakness acutely. In ED, patient was found to have 4.2 cm frontotemporal intraparenchymal hemorrhage with intraventricular extension. Adjacent mass effect with left to right midline shift of 4 mm. Effacement of the suprasellar cistern. Patient is also found to have SBP ~ 200. Now patient vented and sedated in the ED with ICH score 4-5 with extension posturing. Discussed with family gravity of situation and expectation of likely poor functional outcome.     Suggestion:  May keep sodium 145-155.  Seizure precautions  Continue Keppra  Keep Magnesium >2    Dagoberto Yarbrough NP  [phone]

## 2020-01-19 NOTE — DIETITIAN INITIAL EVALUATION ADULT. - PHYSICAL APPEARANCE
BMI: 25.6 (using dosing wt 78.7 kg 1/18). 80 kg noted 1/19 - will continue to monitor. Intubated at this time. Abd noted non-distended per progress notes. 1+ edema (L leg). Last BM noted 1/18. OG tube in. Skin: L LE cellulitis noted.

## 2020-01-19 NOTE — PROGRESS NOTE ADULT - SUBJECTIVE AND OBJECTIVE BOX
Patient was seen and examined in ICU. Spoke with RN. Chart reviewed.  Vital Signs Last 24 Hrs  T(F): 99 (18 Jan 2020 20:00), Max: 102.1 (18 Jan 2020 16:00)  HR: 68 (19 Jan 2020 08:15) (52 - 98)  BP: 114/63 (19 Jan 2020 07:30) (96/52 - 162/89)  SpO2: 99% (19 Jan 2020 08:15) (99% - 100%)  MEDICATIONS  (STANDING):  ceFAZolin   IVPB 1000 milliGRAM(s) IV Intermittent every 8 hours  chlorhexidine 0.12% Liquid 15 milliLiter(s) Oral Mucosa every 12 hours  chlorhexidine 4% Liquid 1 Application(s) Topical <User Schedule>  levETIRAcetam  IVPB 1000 milliGRAM(s) IV Intermittent every 12 hours  niCARdipine Infusion 5 mG/Hr (25 mL/Hr) IV Continuous <Continuous>  pantoprazole  Injectable 40 milliGRAM(s) IV Push daily  propofol Infusion 20 MICROgram(s)/kG/Min (9.24 mL/Hr) IV Continuous <Continuous>  sodium chloride 3%. 500 milliLiter(s) (25 mL/Hr) IV Continuous <Continuous>    MEDICATIONS  (PRN):  acetaminophen   Tablet .. 650 milliGRAM(s) Oral every 6 hours PRN Temp greater or equal to 38C (100.4F)    Labs:                        9.1    12.42 )-----------( 262      ( 19 Jan 2020 04:30 )             27.2                         8.3    9.58  )-----------( 236      ( 18 Jan 2020 16:00 )             25.9     19 Jan 2020 04:30    142    |  107    |  24     ----------------------------<  156    3.7     |  21     |  1.0    18 Jan 2020 16:00    139    |  105    |  24     ----------------------------<  143    3.9     |  22     |  1.2      Ca    9.0        19 Jan 2020 04:30  Ca    8.9        18 Jan 2020 16:00  Mg     1.9       19 Jan 2020 04:30  Mg     1.9       18 Jan 2020 04:30    TPro  6.8    /  Alb  3.7    /  TBili  0.7    /  DBili  x      /  AST  38     /  ALT  8      /  AlkPhos  41     19 Jan 2020 04:30  TPro  6.5    /  Alb  3.7    /  TBili  0.4    /  DBili  x      /  AST  16     /  ALT  <5     /  AlkPhos  39     18 Jan 2020 04:30    PT/INR - ( 17 Jan 2020 19:04 )   PT: 12.90 sec;   INR: 1.12 ratio         PTT - ( 17 Jan 2020 19:04 )  PTT:26.8 sec      on vent via ETT  R IJ  a-line    A/P:  87 yo man with hx of CVA (ICH), Prostate cancer, Anemia presents to ED for right sided weakness ; was found to have 4.2 cm frontotemporal intraparenchymal hemorrhage with intraventricular extension, adjacent mass effect with left to right midline shift of 4 mm, effacement of the suprasellar cistern.    safe vent settings    BP control    NS f/u    keppra    -Ancef 1 gm iv q8h for LLE cellulitis as per ID    DVT prophylaxis  Decubitus prevention- all measures as per RN protocol  Please call or text me with any questions or updates

## 2020-01-20 LAB
ALBUMIN SERPL ELPH-MCNC: 3.3 G/DL — LOW (ref 3.5–5.2)
ALP SERPL-CCNC: 36 U/L — SIGNIFICANT CHANGE UP (ref 30–115)
ALT FLD-CCNC: 8 U/L — SIGNIFICANT CHANGE UP (ref 0–41)
ANION GAP SERPL CALC-SCNC: 11 MMOL/L — SIGNIFICANT CHANGE UP (ref 7–14)
AST SERPL-CCNC: 34 U/L — SIGNIFICANT CHANGE UP (ref 0–41)
BASOPHILS # BLD AUTO: 0.01 K/UL — SIGNIFICANT CHANGE UP (ref 0–0.2)
BASOPHILS NFR BLD AUTO: 0.1 % — SIGNIFICANT CHANGE UP (ref 0–1)
BILIRUB SERPL-MCNC: 0.5 MG/DL — SIGNIFICANT CHANGE UP (ref 0.2–1.2)
BUN SERPL-MCNC: 28 MG/DL — HIGH (ref 10–20)
CALCIUM SERPL-MCNC: 8.5 MG/DL — SIGNIFICANT CHANGE UP (ref 8.5–10.1)
CHLORIDE SERPL-SCNC: 116 MMOL/L — HIGH (ref 98–110)
CO2 SERPL-SCNC: 22 MMOL/L — SIGNIFICANT CHANGE UP (ref 17–32)
CREAT SERPL-MCNC: 1.1 MG/DL — SIGNIFICANT CHANGE UP (ref 0.7–1.5)
CULTURE RESULTS: SIGNIFICANT CHANGE UP
EOSINOPHIL # BLD AUTO: 0 K/UL — SIGNIFICANT CHANGE UP (ref 0–0.7)
EOSINOPHIL NFR BLD AUTO: 0 % — SIGNIFICANT CHANGE UP (ref 0–8)
GLUCOSE SERPL-MCNC: 153 MG/DL — HIGH (ref 70–99)
HCT VFR BLD CALC: 23.9 % — LOW (ref 42–52)
HGB BLD-MCNC: 7.7 G/DL — LOW (ref 14–18)
IMM GRANULOCYTES NFR BLD AUTO: 0.3 % — SIGNIFICANT CHANGE UP (ref 0.1–0.3)
LYMPHOCYTES # BLD AUTO: 0.84 K/UL — LOW (ref 1.2–3.4)
LYMPHOCYTES # BLD AUTO: 12.5 % — LOW (ref 20.5–51.1)
MAGNESIUM SERPL-MCNC: 2.1 MG/DL — SIGNIFICANT CHANGE UP (ref 1.8–2.4)
MCHC RBC-ENTMCNC: 31 PG — SIGNIFICANT CHANGE UP (ref 27–31)
MCHC RBC-ENTMCNC: 32.2 G/DL — SIGNIFICANT CHANGE UP (ref 32–37)
MCV RBC AUTO: 96.4 FL — HIGH (ref 80–94)
MONOCYTES # BLD AUTO: 0.54 K/UL — SIGNIFICANT CHANGE UP (ref 0.1–0.6)
MONOCYTES NFR BLD AUTO: 8 % — SIGNIFICANT CHANGE UP (ref 1.7–9.3)
NEUTROPHILS # BLD AUTO: 5.32 K/UL — SIGNIFICANT CHANGE UP (ref 1.4–6.5)
NEUTROPHILS NFR BLD AUTO: 79.1 % — HIGH (ref 42.2–75.2)
NRBC # BLD: 0 /100 WBCS — SIGNIFICANT CHANGE UP (ref 0–0)
PLATELET # BLD AUTO: 191 K/UL — SIGNIFICANT CHANGE UP (ref 130–400)
POTASSIUM SERPL-MCNC: 3.4 MMOL/L — LOW (ref 3.5–5)
POTASSIUM SERPL-SCNC: 3.4 MMOL/L — LOW (ref 3.5–5)
PROT SERPL-MCNC: 5.8 G/DL — LOW (ref 6–8)
RBC # BLD: 2.48 M/UL — LOW (ref 4.7–6.1)
RBC # FLD: 19.8 % — HIGH (ref 11.5–14.5)
SODIUM SERPL-SCNC: 149 MMOL/L — HIGH (ref 135–146)
SPECIMEN SOURCE: SIGNIFICANT CHANGE UP
WBC # BLD: 6.73 K/UL — SIGNIFICANT CHANGE UP (ref 4.8–10.8)
WBC # FLD AUTO: 6.73 K/UL — SIGNIFICANT CHANGE UP (ref 4.8–10.8)

## 2020-01-20 PROCEDURE — 99232 SBSQ HOSP IP/OBS MODERATE 35: CPT

## 2020-01-20 PROCEDURE — 71045 X-RAY EXAM CHEST 1 VIEW: CPT | Mod: 26

## 2020-01-20 PROCEDURE — 99223 1ST HOSP IP/OBS HIGH 75: CPT

## 2020-01-20 PROCEDURE — 99497 ADVNCD CARE PLAN 30 MIN: CPT | Mod: 25

## 2020-01-20 RX ORDER — MORPHINE SULFATE 50 MG/1
4 CAPSULE, EXTENDED RELEASE ORAL
Refills: 0 | Status: DISCONTINUED | OUTPATIENT
Start: 2020-01-20 | End: 2020-01-22

## 2020-01-20 RX ORDER — ROBINUL 0.2 MG/ML
0.2 INJECTION INTRAMUSCULAR; INTRAVENOUS EVERY 6 HOURS
Refills: 0 | Status: DISCONTINUED | OUTPATIENT
Start: 2020-01-20 | End: 2020-01-22

## 2020-01-20 RX ORDER — LEVETIRACETAM 250 MG/1
1000 TABLET, FILM COATED ORAL EVERY 12 HOURS
Refills: 0 | Status: DISCONTINUED | OUTPATIENT
Start: 2020-01-20 | End: 2020-01-22

## 2020-01-20 RX ORDER — AMLODIPINE BESYLATE 2.5 MG/1
10 TABLET ORAL DAILY
Refills: 0 | Status: DISCONTINUED | OUTPATIENT
Start: 2020-01-20 | End: 2020-01-20

## 2020-01-20 RX ORDER — MORPHINE SULFATE 50 MG/1
6 CAPSULE, EXTENDED RELEASE ORAL
Qty: 100 | Refills: 0 | Status: DISCONTINUED | OUTPATIENT
Start: 2020-01-20 | End: 2020-01-21

## 2020-01-20 RX ORDER — ROBINUL 0.2 MG/ML
0.4 INJECTION INTRAMUSCULAR; INTRAVENOUS ONCE
Refills: 0 | Status: COMPLETED | OUTPATIENT
Start: 2020-01-20 | End: 2020-01-20

## 2020-01-20 RX ORDER — POTASSIUM CHLORIDE 20 MEQ
20 PACKET (EA) ORAL ONCE
Refills: 0 | Status: DISCONTINUED | OUTPATIENT
Start: 2020-01-20 | End: 2020-01-20

## 2020-01-20 RX ADMIN — MORPHINE SULFATE 4 MILLIGRAM(S): 50 CAPSULE, EXTENDED RELEASE ORAL at 22:38

## 2020-01-20 RX ADMIN — MORPHINE SULFATE 6 MG/HR: 50 CAPSULE, EXTENDED RELEASE ORAL at 13:06

## 2020-01-20 RX ADMIN — MORPHINE SULFATE 4 MILLIGRAM(S): 50 CAPSULE, EXTENDED RELEASE ORAL at 12:53

## 2020-01-20 RX ADMIN — MORPHINE SULFATE 4 MILLIGRAM(S): 50 CAPSULE, EXTENDED RELEASE ORAL at 18:53

## 2020-01-20 RX ADMIN — NICARDIPINE HYDROCHLORIDE 25 MG/HR: 30 CAPSULE, EXTENDED RELEASE ORAL at 05:16

## 2020-01-20 RX ADMIN — Medication 2 MILLIGRAM(S): at 12:47

## 2020-01-20 RX ADMIN — ROBINUL 0.4 MILLIGRAM(S): 0.2 INJECTION INTRAMUSCULAR; INTRAVENOUS at 12:34

## 2020-01-20 RX ADMIN — CHLORHEXIDINE GLUCONATE 1 APPLICATION(S): 213 SOLUTION TOPICAL at 05:17

## 2020-01-20 RX ADMIN — MORPHINE SULFATE 4 MILLIGRAM(S): 50 CAPSULE, EXTENDED RELEASE ORAL at 20:30

## 2020-01-20 RX ADMIN — MORPHINE SULFATE 4 MILLIGRAM(S): 50 CAPSULE, EXTENDED RELEASE ORAL at 12:48

## 2020-01-20 RX ADMIN — CHLORHEXIDINE GLUCONATE 15 MILLILITER(S): 213 SOLUTION TOPICAL at 05:16

## 2020-01-20 RX ADMIN — MORPHINE SULFATE 2 MG/HR: 50 CAPSULE, EXTENDED RELEASE ORAL at 12:52

## 2020-01-20 RX ADMIN — LEVETIRACETAM 400 MILLIGRAM(S): 250 TABLET, FILM COATED ORAL at 18:52

## 2020-01-20 RX ADMIN — LEVETIRACETAM 400 MILLIGRAM(S): 250 TABLET, FILM COATED ORAL at 05:17

## 2020-01-20 RX ADMIN — ROBINUL 0.2 MILLIGRAM(S): 0.2 INJECTION INTRAMUSCULAR; INTRAVENOUS at 18:51

## 2020-01-20 RX ADMIN — Medication 100 MILLIGRAM(S): at 05:16

## 2020-01-20 NOTE — PROGRESS NOTE ADULT - SUBJECTIVE AND OBJECTIVE BOX
ADRIAN FENG 86y Male  MRN#: [id number]     SUBJECTIVE  Patient is a 86y old Male who presents with a chief complaint of ICH (19 Jan 2020 05:58)  Currently admitted to medicine with the primary diagnosis of Intracranial hemorrhage  Today is hospital day 3d, and this morning he is intubated and sedated. Family agreeable to terminal weening      OBJECTIVE  PAST MEDICAL & SURGICAL HISTORY  CVA (cerebral vascular accident)  Prostate cancer  Anemia    ALLERGIES:  No Known Allergies    MEDICATIONS:  MEDICATIONS  (STANDING):  glycopyrrolate Injectable 0.2 milliGRAM(s) IV Push every 6 hours  levETIRAcetam  IVPB 1000 milliGRAM(s) IV Intermittent every 12 hours  morphine  Infusion 2 mG/Hr (2 mL/Hr) IV Continuous <Continuous>    MEDICATIONS  (PRN):  LORazepam   Injectable 2 milliGRAM(s) IV Push every 30 minutes PRN Agitation  morphine  - Injectable 4 milliGRAM(s) IV Push every 15 minutes PRN respiratory distress      VITAL SIGNS: Last 24 Hours  Vital Signs Last 24 Hrs  T(C): 37.7 (20 Jan 2020 08:00), Max: 38 (19 Jan 2020 15:54)  T(F): 99.9 (20 Jan 2020 08:00), Max: 100.4 (19 Jan 2020 15:54)  HR: 80 (20 Jan 2020 10:30) (50 - 92)  BP: 144/68 (20 Jan 2020 10:30) (91/52 - 161/84)  BP(mean): 90 (20 Jan 2020 10:30) (64 - 111)  RR: 21 (20 Jan 2020 10:30) (0 - 31)  SpO2: 100% (20 Jan 2020 10:30) (99% - 100%)    LABS:                        7.7    6.73  )-----------( 191      ( 20 Jan 2020 04:30 )             23.9   01-20    149<H>  |  116<H>  |  28<H>  ----------------------------<  153<H>  3.4<L>   |  22  |  1.1    Ca    8.5      20 Jan 2020 04:30  Mg     2.1     01-20    TPro  5.8<L>  /  Alb  3.3<L>  /  TBili  0.5  /  DBili  x   /  AST  34  /  ALT  8   /  AlkPhos  36  01-20    RADIOLOGY:  < from: CT Head No Cont (01.18.20 @ 01:54) >  MPRESSION:  Since CT head performed on January 17, 2020;    Mildly increased size of the left basal ganglia hematoma, currently measuring up to 5.7 cm, previously 5.0 cm. Redemonstrated extension of hemorrhage into the overlying subarachnoid space and into the ventricular system.    < end of copied text >    PHYSICAL EXAM:    GENERAL: Intubated and sedated.   HEENT:  Atraumatic, Normocephalic. EOMI, PERRLA, conjunctiva and sclera clear, No JVD  PULMONARY: Clear to auscultation bilaterally; No wheeze  CARDIOVASCULAR: Regular rate and rhythm; No murmurs, rubs, or gallops  GASTROINTESTINAL: Soft, Nontender, Nondistended; Bowel sounds present  MUSCULOSKELETAL: LLE cellulitis.   SKIN: No rashes or lesions    ASSESSMENT & PLAN  87 y/o male with a hx of CVA and ICH 3 years ago, with no deficients and a hx of prostate ca.   Presneting with right sided weakness.   CT barin showed: 4.3 cm frontotemporal intraparenchymal bleeding extending to ventricles with mass effect and midline shift. Patient was intubated for airway protection  and started on nicardipine drip for hypertensive emergency and 3% NS. His repeat CT head showed worsening of his bleed. Discussion with family about poor prognosis and family agreeable to DNR/DNI, comfort measures, no tube feeds and no IV fluids.     # hypertensive emergency with ICH, midline shift requiring intubation for airway protection.   - now extubated  - CMO, including antiepileptics meds (to be continued)   - morphine and lorazepam for pain and aggitation   - palliative care onboard     # LLE cellulitis  - no ANB, CMO    DVT prophylaxis: CMO  Diet: Diet, CMO

## 2020-01-20 NOTE — PROGRESS NOTE ADULT - ASSESSMENT
85 yo M with hx of CVA (ICH) 3 years ago with no residual deficits, Prostate cancer, Anemia presents to ED for right sided weakness acutely. In ED, patient was found to have 4.2 cm frontotemporal intraparenchymal hemorrhage with intraventricular extension. Adjacent mass effect with left to right midline shift of 4 mm. Effacement of the suprasellar cistern. Patient  also found to have SBP ~ 200. Now patient vented and sedated, not responsive to commands. No acute overnight events    Plan  continue ICU monitoring  keep sodium 145-155.  Seizure precautions  Continue Keppra  Keep Magnesium >2 87 yo M with hx of CVA (ICH) 3 years ago with no residual deficits, Prostate cancer, Anemia presents to ED for right sided weakness acutely. In ED, patient was found to have 4.2 cm frontotemporal intraparenchymal hemorrhage with intraventricular extension. Adjacent mass effect with left to right midline shift of 4 mm. Effacement of the suprasellar cistern. Patient  also found to have SBP ~ 200. Now patient vented and sedated, not responsive to commands. No acute overnight events    Plan  continue ICU monitoring  keep sbp <140  keep sodium 145-155.  Seizure precautions  Continue Keppra  Keep Magnesium >2

## 2020-01-20 NOTE — CDI QUERY NOTE - NSCDIOTHERTXTBX_GEN_ALL_CORE_HH
1 /17> 86y M w/ PMH of CVA w/ no residual deficits, anemia, prostate ca, and previous ICH presents with R. sided deficits.    Admitted for Intraventricular hemorrhage, nontraumatic.     <   CT scan of brain showed a large hemorrhage with midline shift . 4.2 cm frontotemporal intraparenchymal hemorrhage with intraventricular extension. Adjacent mass effect with left to right midline shift of 4 mm. Effacement of the suprasellar cistern    < Patient had decompensating mental status and required emergency intubation for airway protection.      RX: Nicardipine qtt, mechanical ventilation, Neuro checks      In order to capture the severity of illness and risk of mortality, please indicate if additional DX is applicable reflective of above clinical findings.    ?	Cerebral Edema and Cerebral Compression  ?	ICH only  ?	Other, please specify  ?	Clinically unable to determine

## 2020-01-20 NOTE — PROGRESS NOTE ADULT - SUBJECTIVE AND OBJECTIVE BOX
Neurology Follow up note  Patient seen and examined at bedside, he is mechanically vented and sedated not responsive to commands. On cardene and hypertonic saline gtt       Vital Signs Last 24 Hrs  T(C): 37.8 (20 Jan 2020 04:00), Max: 38 (19 Jan 2020 15:54)  T(F): 100 (20 Jan 2020 04:00), Max: 100.4 (19 Jan 2020 15:54)  HR: 80 (20 Jan 2020 06:15) (50 - 92)  BP: 101/55 (20 Jan 2020 06:00) (91/52 - 161/84)  BP(mean): 71 (20 Jan 2020 06:00) (64 - 111)  RR: 20 (20 Jan 2020 06:15) (0 - 31)  SpO2: 100% (20 Jan 2020 06:15) (99% - 100%)      Neurological Exam:   Mental status: Intubated and sedated not following commands  Cranial nerves: Pupils reactive, left gaze preference,+ gag and sluggish corneals    Motor:  No spontaneous extremity mvmts             slight decerebration with pain stimulus  Sensation: Responds to pain all 4 exts  b/l upgoing toes      Medications  acetaminophen   Tablet .. 650 milliGRAM(s) Oral every 6 hours PRN  ceFAZolin   IVPB 1000 milliGRAM(s) IV Intermittent every 8 hours  chlorhexidine 0.12% Liquid 15 milliLiter(s) Oral Mucosa every 12 hours  chlorhexidine 4% Liquid 1 Application(s) Topical <User Schedule>  levETIRAcetam  IVPB 1000 milliGRAM(s) IV Intermittent every 12 hours  niCARdipine Infusion 5 mG/Hr IV Continuous <Continuous>  pantoprazole  Injectable 40 milliGRAM(s) IV Push daily  potassium chloride   Solution 20 milliEquivalent(s) Enteral Tube once  propofol Infusion 20 MICROgram(s)/kG/Min IV Continuous <Continuous>  sodium chloride 3%. 500 milliLiter(s) IV Continuous <Continuous>      Lab    Magnesium, Serum in AM (01.20.20 @ 04:30)    Magnesium, Serum: 2.1 mg/dL    Comprehensive Metabolic Panel in AM (01.20.20 @ 04:30)    Sodium, Serum: 149 mmol/L    Potassium, Serum: 3.4 mmol/L    Chloride, Serum: 116 mmol/L    Carbon Dioxide, Serum: 22 mmol/L    Anion Gap, Serum: 11 mmol/L    Blood Urea Nitrogen, Serum: 28 mg/dL    Creatinine, Serum: 1.1 mg/dL    Glucose, Serum: 153 mg/dL    Calcium, Total Serum: 8.5 mg/dL    Protein Total, Serum: 5.8 g/dL    Albumin, Serum: 3.3 g/dL    Bilirubin Total, Serum: 0.5 mg/dL    Alkaline Phosphatase, Serum: 36 U/L    Aspartate Aminotransferase (AST/SGOT): 34 U/L    Alanine Aminotransferase (ALT/SGPT): 8 U/L    eGFR if Non : 60: Interpretative comment    Complete Blood Count + Automated Diff in AM (01.20.20 @ 04:30)    WBC Count: 6.73 K/uL    RBC Count: 2.48 M/uL    Hemoglobin: 7.7 g/dL    Hematocrit: 23.9 %    Mean Cell Volume: 96.4 fL    Mean Cell Hemoglobin: 31.0 pg    Mean Cell Hemoglobin Conc: 32.2 g/dL    Red Cell Distrib Width: 19.8 %    Platelet Count - Automated: 191 K/uL    Auto Neutrophil #: 5.32 K/uL    Auto Lymphocyte #: 0.84 K/uL    Auto Monocyte #: 0.54 K/uL    Auto Eosinophil #: 0.00 K/uL    Auto Basophil #: 0.01 K/uL    Auto Neutrophil %: 79.1: Differential percentages must be correlated with absolute numbers for  clinical significance. %    Auto Lymphocyte %: 12.5 %    Auto Monocyte %: 8.0 %    Auto Eosinophil %: 0.0 %    Auto Basophil %: 0.1 %    Auto Immature Granulocyte %: 0.3 %    Nucleated RBC: 0 /100 WBCs      Radiology

## 2020-01-20 NOTE — CONSULT NOTE ADULT - ASSESSMENT
Consult Summary: Pt seen for palliative extubation patient s/p ICH with midline shift. Family aware of very poor prognosis and does not want to continue life support or medical interventions. Reviewed comfort measures only with the family who are all in agreement. Pt's son Pete is decision maker.     Pt (+) clonus. Pt withdraws to painful stimuli. Currently appears unresponsive. Pt passed SBT, and has a lot of secretions. Pt will likely sustain for hours to days and maybe downgraded      Morphine Equivalent Daily Dose (MEDD): 0/24    Recommendations:  DNR/DNI (after extubation) CMO  extubate now   Ativan 2mg IV  Q 1/2 hr PRN agitation  Morphine 4mg IV Q 15 min PRN resp distress   Morphine 4 mg gtt (100mg/100cc)  glycopyrrolate 0.4mg IVP now  glycopyrrolate 0.2mg Q 6 HRS RTC  palliative team call for symptom managemnt      Please Call [phone] PRN Consult Summary: Pt seen for palliative extubation patient s/p ICH with midline shift. Family aware of very poor prognosis and does not want to continue life support or medical interventions. Reviewed comfort measures only with the family who are all in agreement. Pt's son Pete is decision maker. Explained dad never wanted to be kept alive on life support with a poor prognosis (brain damage)    Pt (+) clonus. Pt withdraws to painful stimuli. Currently appears unresponsive. Pt passed SBT, and has a lot of secretions. Pt will likely sustain for hours to days and maybe downgraded. Pt is breathing over vent however. Palliative team present for vent withdrawal.      Morphine Equivalent Daily Dose (MEDD): 0/24    Recommendations:  DNR/DNI (after extubation) CMO  extubate now   Ativan 2mg IV  Q 1/2 hr PRN agitation  Morphine 4mg IV Q 15 min PRN resp distress   Morphine 4 mg gtt (100mg/100cc)  glycopyrrolate 0.4mg IVP now  glycopyrrolate 0.2mg Q 6 HRS RTC  palliative team call for symptom management     titrate Morphine drip up and get new orders for rate as needed  Please Call [phone] 24/7 palliative service

## 2020-01-20 NOTE — PROGRESS NOTE ADULT - SUBJECTIVE AND OBJECTIVE BOX
Over Night Events:    Remains intubated, off propofol since 7:30 am, no on pressor, does not respond to verbal stimuli, opens eyes, febrile  On 3 % NACL  On Nicardipine gtt     ROS:  See HPI    PHYSICAL EXAM    ICU Vital Signs Last 24 Hrs  T(C): 37.8 (20 Jan 2020 04:00), Max: 38 (19 Jan 2020 15:54)  T(F): 100 (20 Jan 2020 04:00), Max: 100.4 (19 Jan 2020 15:54)  HR: 80 (20 Jan 2020 07:00) (50 - 92)  BP: 153/66 (20 Jan 2020 07:00) (91/52 - 161/84)  BP(mean): 88 (20 Jan 2020 07:00) (64 - 111)  ABP: 152/62 (20 Jan 2020 07:00) (100/44 - 188/84)  ABP(mean): 96 (20 Jan 2020 07:00) (60 - 128)  RR: 22 (20 Jan 2020 07:00) (0 - 31)  SpO2: 100% (20 Jan 2020 07:00) (99% - 100%)      General: Does not follwo commands, withdraws to pain,   HEENT: ENZO       ETT       Lymphatic system: No cervical LN   Lungs: Bilateral BS  Cardiovascular: Regular   Gastrointestinal: Soft, Positive BS  Extremities: No clubbing.  Moves extremities.  Full Range of motion   Skin: Warm, intact  Neurological: does not follow commands, withdraws to pain on rt,       01-19-20 @ 07:01  -  01-20-20 @ 07:00  --------------------------------------------------------  IN:    IV PiggyBack: 250 mL    niCARdipine Infusion: 257.5 mL    ns in tub fed  [id number]: 460 mL    propofol Infusion: 63.7 mL    sodium chloride 3%: 30 mL    sodium chloride 3%.: 550 mL  Total IN: 1611.2 mL    OUT:    Voided: 950 mL  Total OUT: 950 mL    Total NET: 661.2 mL    LABS:                            7.7    6.73  )-----------( 191      ( 20 Jan 2020 04:30 )             23.9                                               01-20    149<H>  |  116<H>  |  28<H>  ----------------------------<  153<H>  3.4<L>   |  22  |  1.1    Ca    8.5      20 Jan 2020 04:30  Mg     2.1     01-20    TPro  5.8<L>  /  Alb  3.3<L>  /  TBili  0.5  /  DBili  x   /  AST  34  /  ALT  8   /  AlkPhos  36  01-20                                                   LIVER FUNCTIONS - ( 20 Jan 2020 04:30 )  Alb: 3.3 g/dL / Pro: 5.8 g/dL / ALK PHOS: 36 U/L / ALT: 8 U/L / AST: 34 U/L / GGT: x                                              Culture - Other (collected 18 Jan 2020 11:36)  Source: .Other left leg wound  Preliminary Report (19 Jan 2020 18:11):    Numerous Gram positive organisms    Culture - Abscess with Gram Stain (collected 18 Jan 2020 02:16)  Source: .Abscess Leg - Left  Preliminary Report (19 Jan 2020 17:21):    Moderate Streptococcus agalactiae "Susceptibilities not performed"    Moderate Beta Hemolytic Streptococci, not Group A,B,C,D,F, or G    "Susceptibilities not performed"                                                   Mode: AC/ CMV (Assist Control/ Continuous Mandatory Ventilation)  RR (machine): 22  TV (machine): 450  FiO2: 40  PEEP: 5  ITime: 1  MAP: 13  PIP: 23                                      ABG - ( 20 Jan 2020 04:09 )  pH, Arterial: 7.46  pH, Blood: x     /  pCO2: 34    /  pO2: 124   / HCO3: 24    / Base Excess: 0.9   /  SaO2: 99            MEDICATIONS  (STANDING):  ceFAZolin   IVPB 1000 milliGRAM(s) IV Intermittent every 8 hours  chlorhexidine 0.12% Liquid 15 milliLiter(s) Oral Mucosa every 12 hours  chlorhexidine 4% Liquid 1 Application(s) Topical <User Schedule>  levETIRAcetam  IVPB 1000 milliGRAM(s) IV Intermittent every 12 hours  niCARdipine Infusion 5 mG/Hr (25 mL/Hr) IV Continuous <Continuous>  pantoprazole  Injectable 40 milliGRAM(s) IV Push daily  potassium chloride   Solution 20 milliEquivalent(s) Enteral Tube once  propofol Infusion 20 MICROgram(s)/kG/Min (9.24 mL/Hr) IV Continuous <Continuous>  sodium chloride 3%. 500 milliLiter(s) (25 mL/Hr) IV Continuous <Continuous>    MEDICATIONS  (PRN):  acetaminophen   Tablet .. 650 milliGRAM(s) Oral every 6 hours PRN Temp greater or equal to 38C (100.4F)      Xrays:               ETT high, TLC in place                                                                       ECHO Over Night Events:    Remains intubated, off propofol since 7:30 am, no on pressor, does not respond to verbal stimuli, opens eyes, febrile  On 3 % NACL  On Nicardipine gtt     ROS:  See HPI    PHYSICAL EXAM    ICU Vital Signs Last 24 Hrs  T(C): 37.8 (20 Jan 2020 04:00), Max: 38 (19 Jan 2020 15:54)  T(F): 100 (20 Jan 2020 04:00), Max: 100.4 (19 Jan 2020 15:54)  HR: 80 (20 Jan 2020 07:00) (50 - 92)  BP: 153/66 (20 Jan 2020 07:00) (91/52 - 161/84)  BP(mean): 88 (20 Jan 2020 07:00) (64 - 111)  ABP: 152/62 (20 Jan 2020 07:00) (100/44 - 188/84)  ABP(mean): 96 (20 Jan 2020 07:00) (60 - 128)  RR: 22 (20 Jan 2020 07:00) (0 - 31)  SpO2: 100% (20 Jan 2020 07:00) (99% - 100%)      General: Does not follow commands, withdraws to pain,   HEENT: ENZO       ETT       Lymphatic system: No cervical LN   Lungs: Bilateral BS  Cardiovascular: Regular   Gastrointestinal: Soft, Positive BS  Extremities: No clubbing.  Moves extremities.  Full Range of motion   Skin: Warm, intact  Neurological: does not follow commands, withdraws to pain on rt,       01-19-20 @ 07:01  -  01-20-20 @ 07:00  --------------------------------------------------------  IN:    IV PiggyBack: 250 mL    niCARdipine Infusion: 257.5 mL    ns in tub fed  [id number]: 460 mL    propofol Infusion: 63.7 mL    sodium chloride 3%: 30 mL    sodium chloride 3%.: 550 mL  Total IN: 1611.2 mL    OUT:    Voided: 950 mL  Total OUT: 950 mL    Total NET: 661.2 mL    LABS:                            7.7    6.73  )-----------( 191      ( 20 Jan 2020 04:30 )             23.9                                               01-20    149<H>  |  116<H>  |  28<H>  ----------------------------<  153<H>  3.4<L>   |  22  |  1.1    Ca    8.5      20 Jan 2020 04:30  Mg     2.1     01-20    TPro  5.8<L>  /  Alb  3.3<L>  /  TBili  0.5  /  DBili  x   /  AST  34  /  ALT  8   /  AlkPhos  36  01-20                                                   LIVER FUNCTIONS - ( 20 Jan 2020 04:30 )  Alb: 3.3 g/dL / Pro: 5.8 g/dL / ALK PHOS: 36 U/L / ALT: 8 U/L / AST: 34 U/L / GGT: x                                              Culture - Other (collected 18 Jan 2020 11:36)  Source: .Other left leg wound  Preliminary Report (19 Jan 2020 18:11):    Numerous Gram positive organisms    Culture - Abscess with Gram Stain (collected 18 Jan 2020 02:16)  Source: .Abscess Leg - Left  Preliminary Report (19 Jan 2020 17:21):    Moderate Streptococcus agalactiae "Susceptibilities not performed"    Moderate Beta Hemolytic Streptococci, not Group A,B,C,D,F, or G    "Susceptibilities not performed"                                                   Mode: AC/ CMV (Assist Control/ Continuous Mandatory Ventilation)  RR (machine): 22  TV (machine): 450  FiO2: 40  PEEP: 5  ITime: 1  MAP: 13  PIP: 23                                      ABG - ( 20 Jan 2020 04:09 )  pH, Arterial: 7.46  pH, Blood: x     /  pCO2: 34    /  pO2: 124   / HCO3: 24    / Base Excess: 0.9   /  SaO2: 99            MEDICATIONS  (STANDING):  ceFAZolin   IVPB 1000 milliGRAM(s) IV Intermittent every 8 hours  chlorhexidine 0.12% Liquid 15 milliLiter(s) Oral Mucosa every 12 hours  chlorhexidine 4% Liquid 1 Application(s) Topical <User Schedule>  levETIRAcetam  IVPB 1000 milliGRAM(s) IV Intermittent every 12 hours  niCARdipine Infusion 5 mG/Hr (25 mL/Hr) IV Continuous <Continuous>  pantoprazole  Injectable 40 milliGRAM(s) IV Push daily  potassium chloride   Solution 20 milliEquivalent(s) Enteral Tube once  propofol Infusion 20 MICROgram(s)/kG/Min (9.24 mL/Hr) IV Continuous <Continuous>  sodium chloride 3%. 500 milliLiter(s) (25 mL/Hr) IV Continuous <Continuous>    MEDICATIONS  (PRN):  acetaminophen   Tablet .. 650 milliGRAM(s) Oral every 6 hours PRN Temp greater or equal to 38C (100.4F)      Xrays:               ETT high, TLC in place                                                                       ECHO

## 2020-01-20 NOTE — CONSULT NOTE ADULT - ASSESSMENT
IMP:  - large frontotemporal hemorrhage  - hypertensive emergency  - intubated for airway protection, extubated this morning  - LLE cellulitis    MOLST forms on chart noted - no enteral feeding, no IV fluid  Palliative care and GOC notes appreciated.  pt extubated this morning; no further interventions desired.  No enteral feeding tube.  will f/u as needed.  d/w residents

## 2020-01-20 NOTE — CONSULT NOTE ADULT - ATTENDING COMMENTS
traumatic wound left lower leg--> culture sent    rec: soap and water qd, xeroform gauze, kerlex, ace wrap dressing change bid    no surgery needed
I have personally seen and examined this patient.  I have fully participated in the care of this patient.  I have reviewed all pertinent clinical information, including history, physical exam, plan and note.   I have reviewed all pertinent clinical information and reviewed all relevant imaging and diagnostic studies personally.  I was involved in patient's care upon admission. Presented with acute IPH, Neurosurgery eval. SBP<140. Keppra. Repeat CTH in 24 hrs . Recommendations as above.  Agree with above assessment except as noted.
Patient's family seen at bedside and all questions answered. Patient opened eyes, but did not track and did not follow commands.      Please see NP's notes above for palliative extubation recommendations.  Patient was palliatively extubated this afternoon and received lorazepam 2mg x1 and morphine 4mg x2 with noted ongoing tachypnea with a RR of 35.  Morphine ggt was initiated at 4mg/hour with noted improvement of RR to 25 without additional nonverbal signs of discomfort/pain.  Please call palliative care for ongoing symptom management or any additional recommendations.    30 minutes spent on advance care planning.
pt seen and examined indep, I have read and agree with above exam and poa,    case discussed with ho  see progress nore

## 2020-01-20 NOTE — GOALS OF CARE CONVERSATION - ADVANCED CARE PLANNING - TREATMENT GUIDELINE COMMENT
DNR/DNI, comfort measures only, no escalation of care, no artificial nutrition, no IV antibiotics, no IV fluids.  Patient for Palliative extubation, awaiting additional family members to come to the hospital.  [phone]

## 2020-01-20 NOTE — GOALS OF CARE CONVERSATION - ADVANCED CARE PLANNING - TREATMENT GUIDELINES
Do Not Intubate/No blood draws/DNR Order/No artificial nutrition/No IV fluids/Comfort measures only/No antibiotics

## 2020-01-20 NOTE — GOALS OF CARE CONVERSATION - ADVANCED CARE PLANNING - CONVERSATION DETAILS
Met with family at bedside.  Palliative introduced  Family received a medical update this morning from ICU team.  Family decided on Palliative extubation, DNR/DNI comfort measures only, no escalation of care.  Palliative will make recommendations for Palliative extubation.  x [phone]

## 2020-01-20 NOTE — CONSULT NOTE ADULT - SUBJECTIVE AND OBJECTIVE BOX
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 As per son at bedside, patient was driving to [employer] and felt "not right". So he pulled over to the side and he couldn't get out of the car because of weakness. So wife called EMS who brought him. Patient is not taking ASA or AC at home. No hx of trauma.     In ED, patient was found to have 4.2 cm frontotemporal intraparenchymal hemorrhage with intraventricular extension. Adjacent mass effect with left to right midline shift of 4 mm. Effacement of the suprasellar cistern.  Patient is also found to have SBP ~ 200. Patient was intubated for airway protection and started on nicardipine drip.     Vital Signs Last 24 Hrs  T(C): 37.7 (20 Jan 2020 08:00), Max: 38 (19 Jan 2020 15:54)  T(F): 99.9 (20 Jan 2020 08:00), Max: 100.4 (19 Jan 2020 15:54)  HR: 80 (20 Jan 2020 10:30) (50 - 92)  BP: 144/68 (20 Jan 2020 10:30) (91/52 - 161/84)  BP(mean): 90 (20 Jan 2020 10:30) (64 - 111)  RR: 21 (20 Jan 2020 10:30) (0 - 31)  SpO2: 100% (20 Jan 2020 10:30) (99% - 100%)  Drug Dosing Weight  Height (cm): 175.26 (24 Aug 2018 22:43)  Weight (kg): 78.7 (18 Jan 2020 13:54)  BMI (kg/m2): 25.6 (18 Jan 2020 13:54)  BSA (m2): 1.94 (18 Jan 2020 13:54)  pt unresponsive, withdraws to painful stimuli only, + spontaneous breathing after extubation earlier today  abd soft, ND  +LLE cellulitis    MEDICATIONS  (STANDING):  glycopyrrolate Injectable 0.2 milliGRAM(s) IV Push every 6 hours  levETIRAcetam  IVPB 1000 milliGRAM(s) IV Intermittent every 12 hours  morphine  Infusion 2 mG/Hr (2 mL/Hr) IV Continuous <Continuous>                        7.7    6.73  )-----------( 191      ( 20 Jan 2020 04:30 )             23.9   01-20    149<H>  |  116<H>  |  28<H>  ----------------------------<  153<H>  3.4<L>   |  22  |  1.1    Ca    8.5      20 Jan 2020 04:30  Mg     2.1     01-20  no phos  TPro  5.8<L>  /  Alb  3.3<L>  /  TBili  0.5  /  DBili  x   /  AST  34  /  ALT  8   /  AlkPhos  36  01-20    < from: CT Head No Cont (01.18.20 @ 01:54) >  FINDINGS:    There is mild interval enlargement of the hematoma centered in the left basal ganglia, currently measuring 4.1 x 5.7 x 4.5 cm, previously 4.2 x 5.0 x 4.0 cm.      Again noted is extension of hemorrhage into the overlying subarachnoid space and into the ventricular system, with hemorrhage present within the lateral, third and fourth ventricles.    Stable surrounding edema with small foci of hemorrhage present within the edema.    Stable left to right midline shift measuring approximately 4 mm.    There is no fracture to the calvarium. Mastoid air cells are well aerated bilaterally.     IMPRESSION:  Since CT head performed on January 17, 2020;    Mildly increased size of the left basal ganglia hematoma, currently measuring up to 5.7 cm, previously 5.0 cm. Redemonstrated extension of hemorrhage into the overlying subarachnoid space and into the ventricular system.    < end of copied text >

## 2020-01-21 VITALS — RESPIRATION RATE: 18 BRPM | HEART RATE: 80 BPM

## 2020-01-21 PROCEDURE — 99232 SBSQ HOSP IP/OBS MODERATE 35: CPT

## 2020-01-21 RX ORDER — MORPHINE SULFATE 50 MG/1
10 CAPSULE, EXTENDED RELEASE ORAL
Qty: 100 | Refills: 0 | Status: DISCONTINUED | OUTPATIENT
Start: 2020-01-21 | End: 2020-01-22

## 2020-01-21 RX ADMIN — LEVETIRACETAM 400 MILLIGRAM(S): 250 TABLET, FILM COATED ORAL at 05:13

## 2020-01-21 RX ADMIN — ROBINUL 0.2 MILLIGRAM(S): 0.2 INJECTION INTRAMUSCULAR; INTRAVENOUS at 14:27

## 2020-01-21 RX ADMIN — MORPHINE SULFATE 4 MILLIGRAM(S): 50 CAPSULE, EXTENDED RELEASE ORAL at 03:27

## 2020-01-21 RX ADMIN — MORPHINE SULFATE 4 MILLIGRAM(S): 50 CAPSULE, EXTENDED RELEASE ORAL at 01:52

## 2020-01-21 RX ADMIN — Medication 2 MILLIGRAM(S): at 03:27

## 2020-01-21 RX ADMIN — MORPHINE SULFATE 4 MILLIGRAM(S): 50 CAPSULE, EXTENDED RELEASE ORAL at 05:12

## 2020-01-21 RX ADMIN — Medication 2 MILLIGRAM(S): at 01:30

## 2020-01-21 RX ADMIN — ROBINUL 0.2 MILLIGRAM(S): 0.2 INJECTION INTRAMUSCULAR; INTRAVENOUS at 05:12

## 2020-01-21 RX ADMIN — Medication 2 MILLIGRAM(S): at 00:35

## 2020-01-21 RX ADMIN — MORPHINE SULFATE 4 MILLIGRAM(S): 50 CAPSULE, EXTENDED RELEASE ORAL at 01:53

## 2020-01-21 RX ADMIN — ROBINUL 0.2 MILLIGRAM(S): 0.2 INJECTION INTRAMUSCULAR; INTRAVENOUS at 17:13

## 2020-01-21 RX ADMIN — Medication 2 MILLIGRAM(S): at 05:12

## 2020-01-21 RX ADMIN — ROBINUL 0.2 MILLIGRAM(S): 0.2 INJECTION INTRAMUSCULAR; INTRAVENOUS at 00:35

## 2020-01-21 RX ADMIN — LEVETIRACETAM 400 MILLIGRAM(S): 250 TABLET, FILM COATED ORAL at 17:14

## 2020-01-21 NOTE — PROGRESS NOTE ADULT - SUBJECTIVE AND OBJECTIVE BOX
Over Night Events: Liberated last pm. Comfort measures only     ROS:  See HPI    PHYSICAL EXAM    ICU Vital Signs Last 24 Hrs  T(C): 37.9 (20 Jan 2020 16:00), Max: 37.9 (20 Jan 2020 16:00)  T(F): 100.2 (20 Jan 2020 16:00), Max: 100.2 (20 Jan 2020 16:00)  HR: 86 (21 Jan 2020 08:00) (56 - 106)  BP: 167/70 (20 Jan 2020 12:00) (110/56 - 171/78)  BP(mean): 108 (20 Jan 2020 12:00) (75 - 114)  ABP: 156/124 (20 Jan 2020 10:15) (124/50 - 156/124)  ABP(mean): 136 (20 Jan 2020 10:15) (76 - 136)  RR: 17 (21 Jan 2020 08:00) (13 - 27)  SpO2: 100% (20 Jan 2020 12:30) (100% - 100%)      General: Does not respond to arianna stimuli on morphine gtt   HEENT: ENZO             Lymph Nodes: No cervical LN   Lungs: Bilateral BS  Cardiovascular: Regular   Abdomen: Soft, Positive BS  Extremities: No clubbing   Skin: Warm  Neurological: Does not follow commands       01-20-20 @ 07:01  -  01-21-20 @ 07:00  --------------------------------------------------------  IN:    IV PiggyBack: 100 mL    morphine  Infusion: 78 mL    morphine  Infusion: 4 mL    niCARdipine Infusion: 25 mL    ns in tub fed  [id number]: 135 mL    propofol Infusion: 2.4 mL    sodium chloride 3%: 75 mL  Total IN: 419.4 mL    OUT:    Voided: 150 mL  Total OUT: 150 mL    Total NET: 269.4 mL      01-21-20 @ 07:01 - 01-21-20 @ 08:35  --------------------------------------------------------  IN:    morphine  Infusion: 6 mL  Total IN: 6 mL    OUT:  Total OUT: 0 mL    Total NET: 6 mL          LABS:                          7.7    6.73  )-----------( 191      ( 20 Jan 2020 04:30 )             23.9                                               01-20    149<H>  |  116<H>  |  28<H>  ----------------------------<  153<H>  3.4<L>   |  22  |  1.1    Ca    8.5      20 Jan 2020 04:30  Mg     2.1     01-20    TPro  5.8<L>  /  Alb  3.3<L>  /  TBili  0.5  /  DBili  x   /  AST  34  /  ALT  8   /  AlkPhos  36  01-20                                            LIVER FUNCTIONS - ( 20 Jan 2020 04:30 )  Alb: 3.3 g/dL / Pro: 5.8 g/dL / ALK PHOS: 36 U/L / ALT: 8 U/L / AST: 34 U/L / GGT: x                                              Culture - Other (collected 18 Jan 2020 11:36)  Source: .Other left leg wound  Final Report (20 Jan 2020 18:53):    Numerous Coag Negative Staphylococcus "Susceptibilities not performed"    Numerous Streptococcus agalactiae (Group B)    Streptococcus agalactiae (Group B) isolated    Group B streptococci are susceptible to ampicillin,    penicillin and cefazolin, but maybe resistant to    erythromycin and clindamycin.    Recommendations for intrapartum prophylaxis for Group B    streptococci are penicillin or ampicillin.                                                                                   ABG - ( 20 Jan 2020 04:09 )  pH, Arterial: 7.46  pH, Blood: x     /  pCO2: 34    /  pO2: 124   / HCO3: 24    / Base Excess: 0.9   /  SaO2: 99        MEDICATIONS  (STANDING):  glycopyrrolate Injectable 0.2 milliGRAM(s) IV Push every 6 hours  levETIRAcetam  IVPB 1000 milliGRAM(s) IV Intermittent every 12 hours  morphine  Infusion 6 mG/Hr (6 mL/Hr) IV Continuous <Continuous>    MEDICATIONS  (PRN):  LORazepam   Injectable 2 milliGRAM(s) IV Push every 30 minutes PRN Agitation  morphine  - Injectable 4 milliGRAM(s) IV Push every 15 minutes PRN respiratory distress      Xrays:                                                                                     ECHO Over Night Events: Liberated last pm. Comfort measures only     ROS:  See HPI    PHYSICAL EXAM    ICU Vital Signs Last 24 Hrs  T(C): 37.9 (20 Jan 2020 16:00), Max: 37.9 (20 Jan 2020 16:00)  T(F): 100.2 (20 Jan 2020 16:00), Max: 100.2 (20 Jan 2020 16:00)  HR: 86 (21 Jan 2020 08:00) (56 - 106)  BP: 167/70 (20 Jan 2020 12:00) (110/56 - 171/78)  BP(mean): 108 (20 Jan 2020 12:00) (75 - 114)  ABP: 156/124 (20 Jan 2020 10:15) (124/50 - 156/124)  ABP(mean): 136 (20 Jan 2020 10:15) (76 - 136)  RR: 17 (21 Jan 2020 08:00) (13 - 27)  SpO2: 100% (20 Jan 2020 12:30) (100% - 100%)      General: Does not respond to arianna stimuli on morphine gtt   HEENT: ENZO             Lymph Nodes: No cervical LN   Lungs: Bilateral BS, decreased  Cardiovascular: Regular   Abdomen: Soft, Positive BS  Extremities: No clubbing   Skin: Warm  Neurological: Does not follow commands       01-20-20 @ 07:01  -  01-21-20 @ 07:00  --------------------------------------------------------  IN:    IV PiggyBack: 100 mL    morphine  Infusion: 78 mL    morphine  Infusion: 4 mL    niCARdipine Infusion: 25 mL    ns in tub fed  [id number]: 135 mL    propofol Infusion: 2.4 mL    sodium chloride 3%: 75 mL  Total IN: 419.4 mL    OUT:    Voided: 150 mL  Total OUT: 150 mL    Total NET: 269.4 mL      01-21-20 @ 07:01  -  01-21-20 @ 08:35  --------------------------------------------------------  IN:    morphine  Infusion: 6 mL  Total IN: 6 mL    OUT:  Total OUT: 0 mL    Total NET: 6 mL          LABS:                          7.7    6.73  )-----------( 191      ( 20 Jan 2020 04:30 )             23.9                                               01-20    149<H>  |  116<H>  |  28<H>  ----------------------------<  153<H>  3.4<L>   |  22  |  1.1    Ca    8.5      20 Jan 2020 04:30  Mg     2.1     01-20    TPro  5.8<L>  /  Alb  3.3<L>  /  TBili  0.5  /  DBili  x   /  AST  34  /  ALT  8   /  AlkPhos  36  01-20                                            LIVER FUNCTIONS - ( 20 Jan 2020 04:30 )  Alb: 3.3 g/dL / Pro: 5.8 g/dL / ALK PHOS: 36 U/L / ALT: 8 U/L / AST: 34 U/L / GGT: x                                              Culture - Other (collected 18 Jan 2020 11:36)  Source: .Other left leg wound  Final Report (20 Jan 2020 18:53):    Numerous Coag Negative Staphylococcus "Susceptibilities not performed"    Numerous Streptococcus agalactiae (Group B)    Streptococcus agalactiae (Group B) isolated    Group B streptococci are susceptible to ampicillin,    penicillin and cefazolin, but maybe resistant to    erythromycin and clindamycin.    Recommendations for intrapartum prophylaxis for Group B    streptococci are penicillin or ampicillin.                                                                                   ABG - ( 20 Jan 2020 04:09 )  pH, Arterial: 7.46  pH, Blood: x     /  pCO2: 34    /  pO2: 124   / HCO3: 24    / Base Excess: 0.9   /  SaO2: 99        MEDICATIONS  (STANDING):  glycopyrrolate Injectable 0.2 milliGRAM(s) IV Push every 6 hours  levETIRAcetam  IVPB 1000 milliGRAM(s) IV Intermittent every 12 hours  morphine  Infusion 6 mG/Hr (6 mL/Hr) IV Continuous <Continuous>    MEDICATIONS  (PRN):  LORazepam   Injectable 2 milliGRAM(s) IV Push every 30 minutes PRN Agitation  morphine  - Injectable 4 milliGRAM(s) IV Push every 15 minutes PRN respiratory distress      Xrays:                                                                                     ECHO

## 2020-01-21 NOTE — PROGRESS NOTE ADULT - ATTENDING COMMENTS
I have personally seen and examined this patient.  I have fully participated in the care of this patient.  I have reviewed all pertinent clinical information, including history, physical exam, plan and note.   I have reviewed all pertinent clinical information and reviewed all relevant imaging and diagnostic studies personally. Patient opens his eyes to noxious stimulation. Extension posting to pain. Left gaze preference. Some regards. Not following commands. Routine EEG  Agree with above assessment except as noted.
Pt seen examind   will hold sedation sbt when awake
I have personally seen and examined this patient.  I have fully participated in the care of this patient.  I have reviewed all pertinent clinical information, including history, physical exam, plan and note.   I have reviewed all pertinent clinical information and reviewed all relevant imaging and diagnostic studies personally.  CMO paper work signed.   Agree with above assessment except as noted.
patient seen and examined, agree with above, sp liberation, transfer to floor, poor prognosis
patient seen and examined, agree with above, for liberation, poor prognosis

## 2020-01-21 NOTE — PROGRESS NOTE ADULT - SUBJECTIVE AND OBJECTIVE BOX
86yMale with diagnosis: INTRACRANIAL HEMORRHAGE      Patient seen for follow up.      PHYSICAL EXAM  Pt seen in am RR 28-30  Otherwise no s/s of distress  mottling noted on LEs    T(C): --  T(F): --  HR: 84 (78 - 106)  BP: --  RR: 16 (13 - 17)  SpO2: --              LABS:                          7.7    6.73  )-----------( 191      ( 20 Jan 2020 04:30 )             23.9                                                                                      01-20    149<H>  |  116<H>  |  28<H>  ----------------------------<  153<H>  3.4<L>   |  22  |  1.1    Ca    8.5      20 Jan 2020 04:30  Mg     2.1     01-20    TPro  5.8<L>  /  Alb  3.3<L>  /  TBili  0.5  /  DBili  x   /  AST  34  /  ALT  8   /  AlkPhos  36  01-20                                                      MEDICATIONS  (STANDING):  glycopyrrolate Injectable 0.2 milliGRAM(s) IV Push every 6 hours  levETIRAcetam  IVPB 1000 milliGRAM(s) IV Intermittent every 12 hours  morphine  Infusion 10 mG/Hr (10 mL/Hr) IV Continuous <Continuous>    MEDICATIONS  (PRN):  LORazepam   Injectable 2 milliGRAM(s) IV Push every 30 minutes PRN Agitation  morphine  - Injectable 4 milliGRAM(s) IV Push every 15 minutes PRN respiratory distress

## 2020-01-21 NOTE — PROGRESS NOTE ADULT - SUBJECTIVE AND OBJECTIVE BOX
Patient was seen and examined. Spoke with RN. Chart reviewed.  No events overnight.  Vital Signs Last 24 Hrs  T(F): 100.2 (20 Jan 2020 16:00), Max: 100.2 (20 Jan 2020 16:00)  HR: 86 (21 Jan 2020 10:00) (58 - 106)  BP: --  SpO2: 100% (20 Jan 2020 12:30) (100% - 100%)  MEDICATIONS  (STANDING):  glycopyrrolate Injectable 0.2 milliGRAM(s) IV Push every 6 hours  levETIRAcetam  IVPB 1000 milliGRAM(s) IV Intermittent every 12 hours  morphine  Infusion 10 mG/Hr (10 mL/Hr) IV Continuous <Continuous>    MEDICATIONS  (PRN):  LORazepam   Injectable 2 milliGRAM(s) IV Push every 30 minutes PRN Agitation  morphine  - Injectable 4 milliGRAM(s) IV Push every 15 minutes PRN respiratory distress    Labs:                        7.7    6.73  )-----------( 191      ( 20 Jan 2020 04:30 )             23.9     20 Jan 2020 04:30    149    |  116    |  28     ----------------------------<  153    3.4     |  22     |  1.1    19 Jan 2020 23:00    146    |  114    |  27     ----------------------------<  134    3.4     |  21     |  1.1      Ca    8.5        20 Jan 2020 04:30  Ca    8.4        19 Jan 2020 23:00  Mg     2.1       20 Jan 2020 04:30    TPro  5.8    /  Alb  3.3    /  TBili  0.5    /  DBili  x      /  AST  34     /  ALT  8      /  AlkPhos  36     20 Jan 2020 04:30          General: not responsive   Head:  Normocephalic, atraumatic  ENT:  Mucosa moist, no ulcerations  Neck:  Supple, no JVD,   Skin: no breakdowns (as per RN)  Resp: CTA B/L  CV: RRR, S1S2,   GI: Soft, NT, bowel sounds  MS: No edema, + peripheral pulses, LLE cellulitis         A/P:  85 yo man with hx of CVA (ICH), Prostate cancer, Anemia presents to ED for right sided weakness ; was found to have 4.2 cm frontotemporal intraparenchymal hemorrhage with intraventricular extension, adjacent mass effect with left to right midline shift of 4 mm, effacement of the suprasellar cistern.    s/p palliative extubation   comfort/palliative measures as per GOC   no tube feeds, no IVF, no antibiotics   d/w family at the bedside   palliative f/u   Decubitus prevention- all measures as per RN protocol  Please call or text me with any questions or updates

## 2020-01-21 NOTE — PROGRESS NOTE ADULT - ASSESSMENT
IMPRESSION:    Basal ganglia bleed with midline shift   Hypertensive emergency   Acute hypoxic resp failure   Celllulits     PLAN:    CNS: Comfort measures only, cont morphine gtt per AI     HEENT: Oral care,     PULMONARY:  HOB @ 45 degrees.      GI: NPO    MUSCULOSKELETAL: bed rest     Lines: Keep Rt TLC   Wilkerson: Condom   Code status: comfort measures only   Dispo: vent unit transfer IMPRESSION:    Basal ganglia bleed with midline shift   Hypertensive emergency   Acute hypoxic resp failure   Celllulits     PLAN:    CNS: Comfort measures only, cont morphine gtt per AI     HEENT: Oral care,     PULMONARY:  HOB @ 45 degrees.      GI: NPO    MUSCULOSKELETAL: bed rest     Lines: Keep Rt TLC   Wilkerson: Condom   Code status: comfort measures only   Dispo: vent unit transfer   palliative care f/up

## 2020-01-21 NOTE — CHART NOTE - NSCHARTNOTEFT_GEN_A_CORE
85 y/o male with a hx of CVA and ICH 3 years ago, with no deficients and a hx of prostate ca.   Presneting with right sided weakness.   CT barin showed: 4.3 cm frontotemporal intraparenchymal bleeding extending to ventricles with mass effect and midline shift. Patient was intubated for airway protection  and started on nicardipine drip for hypertensive emergency and 3% NS. His repeat CT head showed worsening of his bleed. Discussion with family about poor prognosis and family agreeable to DNR/DNI, comfort measures, no tube feeds and no IV fluids. He was seen by palliative care and recommendations placed. Patient was liberated on 1/20 to room air. He remained hemodynamically stable, but minimally responsive.     MEDICATIONS  (STANDING):  glycopyrrolate Injectable 0.2 milliGRAM(s) IV Push every 6 hours  levETIRAcetam  IVPB 1000 milliGRAM(s) IV Intermittent every 12 hours  morphine  Infusion 10 mG/Hr (10 mL/Hr) IV Continuous <Continuous>    MEDICATIONS  (PRN):  LORazepam   Injectable 2 milliGRAM(s) IV Push every 30 minutes PRN Agitation  morphine  - Injectable 4 milliGRAM(s) IV Push every 15 minutes PRN respiratory distress                          7.7    6.73  )-----------( 191      ( 20 Jan 2020 04:30 )             23.9   01-20    149<H>  |  116<H>  |  28<H>  ----------------------------<  153<H>  3.4<L>   |  22  |  1.1    Ca    8.5      20 Jan 2020 04:30  Mg     2.1     01-20    TPro  5.8<L>  /  Alb  3.3<L>  /  TBili  0.5  /  DBili  x   /  AST  34  /  ALT  8   /  AlkPhos  36  01-20      PHYSICAL EXAM:    GENERAL: minimally responsive   HEENT:  Atraumatic, Normocephalic. EOMI, PERRLA, conjunctiva and sclera clear, No JVD  PULMONARY: Clear to auscultation bilaterally; No wheeze  CARDIOVASCULAR: Regular rate and rhythm; No murmurs, rubs, or gallops  GASTROINTESTINAL: Soft, Nontender, Nondistended; Bowel sounds present  MUSCULOSKELETAL: LLE cellulitis.   SKIN: No rashes or lesions    ASSESSMENT & PLAN  85 y/o male with a hx of CVA and ICH 3 years ago, with no deficients and a hx of prostate ca.   Presneting with right sided weakness.   CT barin showed: 4.3 cm frontotemporal intraparenchymal bleeding extending to ventricles with mass effect and midline shift. Patient was intubated for airway protection  and started on nicardipine drip for hypertensive emergency and 3% NS. His repeat CT head showed worsening of his bleed. Discussion with family about poor prognosis and family agreeable to DNR/DNI, comfort measures, no tube feeds and no IV fluids.     # hypertensive emergency with ICH, midline shift requiring intubation for airway protection.   - now extubated  - CMO, including antiepileptics meds (to be continued)   - morphine and lorazepam for pain and aggitation PRN  - continue morphine drip  - palliative care onboard     # LLE cellulitis  - no ANB, CMO    DVT prophylaxis: CMO  Diet: Diet, CMO  Dispo: unknown   f/u with palliatuve 87 y/o male with a hx of CVA and ICH 3 years ago, with no deficients and a hx of prostate ca.   Presneting with right sided weakness.   CT barin showed: 4.3 cm frontotemporal intraparenchymal bleeding extending to ventricles with mass effect and midline shift. Patient was intubated for airway protection  and started on nicardipine drip for hypertensive emergency and 3% NS. His repeat CT head showed worsening of his bleed. Discussion with family about poor prognosis and family agreeable to DNR/DNI, comfort measures, no tube feeds and no IV fluids. He was seen by palliative care and recommendations placed. Patient was liberated on 1/20 to room air. He remained hemodynamically stable, but minimally responsive.     MEDICATIONS  (STANDING):  glycopyrrolate Injectable 0.2 milliGRAM(s) IV Push every 6 hours  levETIRAcetam  IVPB 1000 milliGRAM(s) IV Intermittent every 12 hours  morphine  Infusion 10 mG/Hr (10 mL/Hr) IV Continuous <Continuous>    MEDICATIONS  (PRN):  LORazepam   Injectable 2 milliGRAM(s) IV Push every 30 minutes PRN Agitation  morphine  - Injectable 4 milliGRAM(s) IV Push every 15 minutes PRN respiratory distress                          7.7    6.73  )-----------( 191      ( 20 Jan 2020 04:30 )             23.9   01-20    149<H>  |  116<H>  |  28<H>  ----------------------------<  153<H>  3.4<L>   |  22  |  1.1    Ca    8.5      20 Jan 2020 04:30  Mg     2.1     01-20    TPro  5.8<L>  /  Alb  3.3<L>  /  TBili  0.5  /  DBili  x   /  AST  34  /  ALT  8   /  AlkPhos  36  01-20      PHYSICAL EXAM:    GENERAL: minimally responsive   HEENT:  Atraumatic, Normocephalic. EOMI, PERRLA, conjunctiva and sclera clear, No JVD  PULMONARY: Clear to auscultation bilaterally; No wheeze  CARDIOVASCULAR: Regular rate and rhythm; No murmurs, rubs, or gallops  GASTROINTESTINAL: Soft, Nontender, Nondistended; Bowel sounds present  MUSCULOSKELETAL: LLE cellulitis.   SKIN: No rashes or lesions    ASSESSMENT & PLAN  87 y/o male with a hx of CVA and ICH 3 years ago, with no deficients and a hx of prostate ca.   Presneting with right sided weakness.   CT barin showed: 4.3 cm frontotemporal intraparenchymal bleeding extending to ventricles with mass effect and midline shift. Patient was intubated for airway protection  and started on nicardipine drip for hypertensive emergency and 3% NS. His repeat CT head showed worsening of his bleed. Discussion with family about poor prognosis and family agreeable to DNR/DNI, comfort measures, no tube feeds and no IV fluids.     # hypertensive emergency with ICH, midline shift and edema requiring intubation for airway protection.   - now extubated  - CMO, including antiepileptics meds (to be continued)   - morphine and lorazepam for pain and aggitation PRN  - continue morphine drip  - palliative care onboard     # LLE cellulitis  - no ANB, CMO    DVT prophylaxis: CMO  Diet: Diet, CMO  Dispo: unknown   f/u with palliatuve

## 2020-01-22 RX ADMIN — MORPHINE SULFATE 10 MG/HR: 50 CAPSULE, EXTENDED RELEASE ORAL at 02:51

## 2020-01-22 RX ADMIN — ROBINUL 0.2 MILLIGRAM(S): 0.2 INJECTION INTRAMUSCULAR; INTRAVENOUS at 00:59

## 2020-01-22 NOTE — DISCHARGE NOTE FOR THE EXPIRED PATIENT - HOSPITAL COURSE
87 y/o male with a hx of CVA and ICH 3 years ago, with no deficients and a hx of prostate ca. Presneting with right sided weakness. CT barin showed: 4.3 cm frontotemporal intraparenchymal bleeding extending to ventricles with mass effect and midline shift. Patient was intubated for airway protection  and started on nicardipine drip for hypertensive emergency and 3% NS. His repeat CT head showed worsening of his bleed. Discussion with family about poor prognosis and family agreeable to DNR/DNI, comfort measures, no tube feeds and no IV fluids. He was seen by palliative care and recommendations placed. Patient was liberated on  to room air. He remained hemodynamically stable, but minimally responsive. Patient's family opted for comfort care measures only.  Patient was subsequently placed on morphine drip.  Patient  on 2020 at 5:25AM.

## 2020-01-23 LAB
CULTURE RESULTS: SIGNIFICANT CHANGE UP
SPECIMEN SOURCE: SIGNIFICANT CHANGE UP

## 2020-01-29 DIAGNOSIS — Z66 DO NOT RESUSCITATE: ICD-10-CM

## 2020-01-29 DIAGNOSIS — G93.5 COMPRESSION OF BRAIN: ICD-10-CM

## 2020-01-29 DIAGNOSIS — L03.116 CELLULITIS OF LEFT LOWER LIMB: ICD-10-CM

## 2020-01-29 DIAGNOSIS — D64.9 ANEMIA, UNSPECIFIED: ICD-10-CM

## 2020-01-29 DIAGNOSIS — I61.5 NONTRAUMATIC INTRACEREBRAL HEMORRHAGE, INTRAVENTRICULAR: ICD-10-CM

## 2020-01-29 DIAGNOSIS — I69.151 HEMIPLEGIA AND HEMIPARESIS FOLLOWING NONTRAUMATIC INTRACEREBRAL HEMORRHAGE AFFECTING RIGHT DOMINANT SIDE: ICD-10-CM

## 2020-01-29 DIAGNOSIS — Z87.891 PERSONAL HISTORY OF NICOTINE DEPENDENCE: ICD-10-CM

## 2020-01-29 DIAGNOSIS — R29.730 NIHSS SCORE 30: ICD-10-CM

## 2020-01-29 DIAGNOSIS — I16.1 HYPERTENSIVE EMERGENCY: ICD-10-CM

## 2020-01-29 DIAGNOSIS — I69.220 APHASIA FOLLOWING OTHER NONTRAUMATIC INTRACRANIAL HEMORRHAGE: ICD-10-CM

## 2020-01-29 DIAGNOSIS — J96.01 ACUTE RESPIRATORY FAILURE WITH HYPOXIA: ICD-10-CM

## 2020-01-29 DIAGNOSIS — Z51.5 ENCOUNTER FOR PALLIATIVE CARE: ICD-10-CM

## 2022-05-20 NOTE — PROGRESS NOTE ADULT - SUBJECTIVE AND OBJECTIVE BOX
Patient was seen and examined in ICU; on vent. Chart reviewed.  On cardene  Vital Signs Last 24 Hrs  T(F): 100 (20 Jan 2020 04:00), Max: 100.4 (19 Jan 2020 15:54)  HR: 80 (20 Jan 2020 07:00) (50 - 92)  BP: 153/66 (20 Jan 2020 07:00) (91/52 - 161/84)  SpO2: 100% (20 Jan 2020 07:00) (99% - 100%)  MEDICATIONS  (STANDING):  ceFAZolin   IVPB 1000 milliGRAM(s) IV Intermittent every 8 hours  chlorhexidine 0.12% Liquid 15 milliLiter(s) Oral Mucosa every 12 hours  chlorhexidine 4% Liquid 1 Application(s) Topical <User Schedule>  levETIRAcetam  IVPB 1000 milliGRAM(s) IV Intermittent every 12 hours  niCARdipine Infusion 5 mG/Hr (25 mL/Hr) IV Continuous <Continuous>  pantoprazole  Injectable 40 milliGRAM(s) IV Push daily  potassium chloride   Solution 20 milliEquivalent(s) Enteral Tube once  propofol Infusion 20 MICROgram(s)/kG/Min (9.24 mL/Hr) IV Continuous <Continuous>  sodium chloride 3%. 500 milliLiter(s) (25 mL/Hr) IV Continuous <Continuous>    MEDICATIONS  (PRN):  acetaminophen   Tablet .. 650 milliGRAM(s) Oral every 6 hours PRN Temp greater or equal to 38C (100.4F)    Labs:                        7.7    6.73  )-----------( 191      ( 20 Jan 2020 04:30 )             23.9                         9.1    12.42 )-----------( 262      ( 19 Jan 2020 04:30 )             27.2     20 Jan 2020 04:30    149    |  116    |  28     ----------------------------<  153    3.4     |  22     |  1.1    19 Jan 2020 23:00    146    |  114    |  27     ----------------------------<  134    3.4     |  21     |  1.1      Ca    8.5        20 Jan 2020 04:30  Ca    8.4        19 Jan 2020 23:00  Mg     2.1       20 Jan 2020 04:30  Mg     1.9       19 Jan 2020 04:30    TPro  5.8    /  Alb  3.3    /  TBili  0.5    /  DBili  x      /  AST  34     /  ALT  8      /  AlkPhos  36     20 Jan 2020 04:30  TPro  6.8    /  Alb  3.7    /  TBili  0.7    /  DBili  x      /  AST  38     /  ALT  8      /  AlkPhos  41     19 Jan 2020 04:30          Culture - Other (collected 18 Jan 2020 11:36)  Source: .Other left leg wound  Preliminary Report (19 Jan 2020 18:11):    Numerous Gram positive organisms    Culture - Abscess with Gram Stain (collected 18 Jan 2020 02:16)  Source: .Abscess Leg - Left  Preliminary Report (19 Jan 2020 17:21):    Moderate Streptococcus agalactiae "Susceptibilities not performed"    Moderate Beta Hemolytic Streptococci, not Group A,B,C,D,F, or G    "Susceptibilities not performed"    on vent via ETT      A/P:  87 yo man with hx of CVA (ICH), Prostate cancer, Anemia presents to ED for right sided weakness ; was found to have 4.2 cm frontotemporal intraparenchymal hemorrhage with intraventricular extension, adjacent mass effect with left to right midline shift of 4 mm, effacement of the suprasellar cistern.    safe vent settings    BP control    NS f/u    keppra    hypertonic saline    monitor CBC- repeat today to verify if pancytopenia true    IV abx- Ancef 1 gm iv q8h for LLE cellulitis as per ID    LLE wound care    plan as per neurology/ ICU team    DVT prophylaxis  Decubitus prevention- all measures as per RN protocol  Please call or text me with any questions or updates Yes